# Patient Record
Sex: FEMALE | Race: WHITE | NOT HISPANIC OR LATINO | Employment: FULL TIME | URBAN - METROPOLITAN AREA
[De-identification: names, ages, dates, MRNs, and addresses within clinical notes are randomized per-mention and may not be internally consistent; named-entity substitution may affect disease eponyms.]

---

## 2023-03-20 PROBLEM — I10 BENIGN HYPERTENSION: Status: ACTIVE | Noted: 2023-03-20

## 2023-03-20 PROBLEM — D25.9 UTERINE LEIOMYOMA: Status: ACTIVE | Noted: 2023-03-20

## 2023-03-20 PROBLEM — B97.7 HUMAN PAPILLOMA VIRUS (HPV) INFECTION: Status: ACTIVE | Noted: 2023-03-20

## 2023-03-20 PROBLEM — N93.9 ABNORMAL UTERINE BLEEDING: Status: ACTIVE | Noted: 2023-03-20

## 2023-03-20 PROBLEM — E66.01 MORBID OBESITY WITH BMI OF 45.0-49.9, ADULT (MULTI): Status: ACTIVE | Noted: 2023-03-20

## 2023-03-20 PROBLEM — E78.5 BORDERLINE HYPERLIPIDEMIA: Status: ACTIVE | Noted: 2023-03-20

## 2023-03-20 PROBLEM — R92.8 ABNORMAL MAMMOGRAM OF LEFT BREAST: Status: ACTIVE | Noted: 2023-03-20

## 2023-03-20 PROBLEM — R93.89 THICKENED ENDOMETRIUM: Status: ACTIVE | Noted: 2023-03-20

## 2023-03-20 PROBLEM — N93.8 DUB (DYSFUNCTIONAL UTERINE BLEEDING): Status: ACTIVE | Noted: 2023-03-20

## 2023-03-20 PROBLEM — G47.19 EXCESSIVE DAYTIME SLEEPINESS: Status: ACTIVE | Noted: 2023-03-20

## 2023-03-20 PROBLEM — N85.2 ENLARGED UTERUS: Status: ACTIVE | Noted: 2023-03-20

## 2023-03-20 PROBLEM — N92.1 MENORRHAGIA WITH IRREGULAR CYCLE: Status: ACTIVE | Noted: 2023-03-20

## 2023-03-20 PROBLEM — Z98.84 BARIATRIC SURGERY STATUS: Status: ACTIVE | Noted: 2023-03-20

## 2023-03-20 PROBLEM — F17.200 TOBACCO USE DISORDER: Status: ACTIVE | Noted: 2023-03-20

## 2023-03-20 PROBLEM — E66.9 OBESITY (BMI 30-39.9): Status: ACTIVE | Noted: 2023-03-20

## 2023-03-20 PROBLEM — N84.0 ENDOMETRIAL POLYP: Status: ACTIVE | Noted: 2023-03-20

## 2023-03-20 PROBLEM — R87.810 CERVICAL HIGH RISK HPV (HUMAN PAPILLOMAVIRUS) TEST POSITIVE: Status: ACTIVE | Noted: 2023-03-20

## 2023-03-20 PROBLEM — E55.9 VITAMIN D DEFICIENCY: Status: ACTIVE | Noted: 2023-03-20

## 2023-03-20 PROBLEM — F54 PSYCHOLOGICAL FACTOR AFFECTING PHYSICAL CONDITION: Status: ACTIVE | Noted: 2023-03-20

## 2023-03-20 PROBLEM — O36.80X0 ENCOUNTER TO DETERMINE FETAL VIABILITY OF PREGNANCY (HHS-HCC): Status: ACTIVE | Noted: 2023-03-20

## 2023-03-20 RX ORDER — LABETALOL 200 MG/1
4 TABLET, FILM COATED ORAL 3 TIMES DAILY
COMMUNITY
Start: 2022-01-20 | End: 2023-03-28 | Stop reason: SDUPTHER

## 2023-03-20 RX ORDER — VALSARTAN 80 MG/1
80 TABLET ORAL DAILY
COMMUNITY
Start: 2020-02-06 | End: 2023-03-28 | Stop reason: SDUPTHER

## 2023-03-28 ENCOUNTER — OFFICE VISIT (OUTPATIENT)
Dept: PRIMARY CARE | Facility: CLINIC | Age: 43
End: 2023-03-28
Payer: COMMERCIAL

## 2023-03-28 VITALS
BODY MASS INDEX: 47.2 KG/M2 | HEART RATE: 79 BPM | DIASTOLIC BLOOD PRESSURE: 90 MMHG | SYSTOLIC BLOOD PRESSURE: 146 MMHG | WEIGHT: 275 LBS | TEMPERATURE: 97.3 F | OXYGEN SATURATION: 98 %

## 2023-03-28 DIAGNOSIS — Z00.00 HEALTHCARE MAINTENANCE: ICD-10-CM

## 2023-03-28 DIAGNOSIS — E66.01 MORBID OBESITY WITH BMI OF 45.0-49.9, ADULT (MULTI): ICD-10-CM

## 2023-03-28 DIAGNOSIS — E88.819 INSULIN RESISTANCE: ICD-10-CM

## 2023-03-28 DIAGNOSIS — I10 BENIGN HYPERTENSION: Primary | ICD-10-CM

## 2023-03-28 DIAGNOSIS — I10 ESSENTIAL HYPERTENSION WITH GOAL BLOOD PRESSURE LESS THAN 140/90: ICD-10-CM

## 2023-03-28 DIAGNOSIS — E55.9 VITAMIN D DEFICIENCY: ICD-10-CM

## 2023-03-28 PROCEDURE — 3080F DIAST BP >= 90 MM HG: CPT | Performed by: FAMILY MEDICINE

## 2023-03-28 PROCEDURE — 99214 OFFICE O/P EST MOD 30 MIN: CPT | Performed by: FAMILY MEDICINE

## 2023-03-28 PROCEDURE — 3077F SYST BP >= 140 MM HG: CPT | Performed by: FAMILY MEDICINE

## 2023-03-28 PROCEDURE — 3008F BODY MASS INDEX DOCD: CPT | Performed by: FAMILY MEDICINE

## 2023-03-28 RX ORDER — VALSARTAN 80 MG/1
80 TABLET ORAL DAILY
Qty: 90 TABLET | Refills: 1 | Status: SHIPPED | OUTPATIENT
Start: 2023-03-28 | End: 2023-06-06 | Stop reason: DRUGHIGH

## 2023-03-28 RX ORDER — LISINOPRIL AND HYDROCHLOROTHIAZIDE 10; 12.5 MG/1; MG/1
1 TABLET ORAL
COMMUNITY
Start: 2016-07-07 | End: 2023-03-28 | Stop reason: DRUGHIGH

## 2023-03-28 RX ORDER — LABETALOL 200 MG/1
3 TABLET, FILM COATED ORAL 3 TIMES DAILY
Qty: 270 TABLET | Refills: 2 | Status: SHIPPED | OUTPATIENT
Start: 2023-03-28 | End: 2023-05-08 | Stop reason: DRUGHIGH

## 2023-03-28 NOTE — PROGRESS NOTES
Subjective   Patient ID: Brittanie Maxwell is a 42 y.o. female who presents for Hypertension.  HPI    HTN: BP elevated today- she had run out of her Valsartan and restarted but has not been on consistently   She is taking her Labetalol regularly, but was hoping to wean off this robyn make     OBESITY: struggling with weight  Trying to adjust diet to healthier choices  She had been approved for bariatric surgery but then became pregnant with her son.  She is approximately 8 months postpartum.    Review of Systems  Review of Systems negative except as noted in HPI and Chief complaint.     Objective               Physical Exam  Constitutional:       General: She is not in acute distress.     Appearance: Normal appearance. She is not ill-appearing.   HENT:      Head: Normocephalic and atraumatic.   Neck:      Vascular: No carotid bruit.   Cardiovascular:      Rate and Rhythm: Normal rate and regular rhythm.      Pulses: Normal pulses.      Heart sounds: Normal heart sounds. No murmur heard.     No gallop.   Pulmonary:      Effort: Pulmonary effort is normal.      Breath sounds: Normal breath sounds. No wheezing, rhonchi or rales.   Musculoskeletal:      Cervical back: Normal range of motion and neck supple. No rigidity or tenderness.   Lymphadenopathy:      Cervical: No cervical adenopathy.   Skin:     General: Skin is warm and dry.   Neurological:      Mental Status: She is alert.   Psychiatric:         Mood and Affect: Mood normal.         Behavior: Behavior normal.       /90 (BP Location: Left arm, Patient Position: Sitting, BP Cuff Size: Adult)   Pulse 79   Temp 36.3 °C (97.3 °F)   Wt 125 kg (275 lb)   SpO2 98%   BMI 47.20 kg/m²      Assessment/Plan   Problem List Items Addressed This Visit          Circulatory    Benign hypertension - Primary    Relevant Orders    CBC    Comprehensive Metabolic Panel    Essential hypertension with goal blood pressure less than 140/90    Relevant Medications     valsartan (Diovan) 80 mg tablet    labetalol (Normodyne) 200 mg tablet       Endocrine/Metabolic    Vitamin D deficiency    Relevant Orders    Vitamin D, Total    Morbid obesity with BMI of 45.0-49.9, adult (CMS/[facility])     Other Visit Diagnoses       Healthcare maintenance        Relevant Orders    CBC    Comprehensive Metabolic Panel    Lipid Panel    TSH with reflex to Free T4 if abnormal    Vitamin D, Total    Insulin resistance        Relevant Orders    Comprehensive Metabolic Panel    Hemoglobin A1C          Follow-up for CPE and lab review at your convenience.  Follow BP at home as we titrate medications.

## 2023-03-28 NOTE — PATIENT INSTRUCTIONS
WEANING OFF LABETALOL:  Week 1: 2 pills three time daily + Valsartan 80 mg once daily  Week 2: 1 pill three times daily + Valsartan 80 mg (may increase to 2 tablets daily if needed)  Week 3: Stop Labetalol and take only Valsartan at  mg daily    Weight Loss agent: Injectables: Ozempic, Wegovy, Saxenda, Moujaro  Oral agents: Contrave, Qsymia    Follow up 1 month for CPE and medication adjustments.

## 2023-03-29 PROBLEM — E88.819 INSULIN RESISTANCE: Status: ACTIVE | Noted: 2023-03-29

## 2023-03-31 ENCOUNTER — PATIENT OUTREACH (OUTPATIENT)
Dept: CARE COORDINATION | Facility: CLINIC | Age: 43
End: 2023-03-31
Payer: COMMERCIAL

## 2023-03-31 NOTE — PROGRESS NOTES
New ref from Dr. Gregorio for need for wt loss. Attempted to call pt without success. Message left on VM with this Rds name and info. Enc pt to return call. Will attempt to reach pt again at later date.

## 2023-04-04 ENCOUNTER — PATIENT OUTREACH (OUTPATIENT)
Dept: CARE COORDINATION | Facility: CLINIC | Age: 43
End: 2023-04-04
Payer: COMMERCIAL

## 2023-04-04 NOTE — PROGRESS NOTES
Last attempt to reach pt without success. Message left on VM with this RDs info. Enc pt to return call if interested in working with an RD

## 2023-04-25 ENCOUNTER — APPOINTMENT (OUTPATIENT)
Dept: PRIMARY CARE | Facility: CLINIC | Age: 43
End: 2023-04-25
Payer: COMMERCIAL

## 2023-04-28 ENCOUNTER — LAB (OUTPATIENT)
Dept: LAB | Facility: LAB | Age: 43
End: 2023-04-28
Payer: COMMERCIAL

## 2023-04-28 DIAGNOSIS — Z00.00 HEALTHCARE MAINTENANCE: ICD-10-CM

## 2023-04-28 DIAGNOSIS — E88.819 INSULIN RESISTANCE: ICD-10-CM

## 2023-04-28 DIAGNOSIS — E55.9 VITAMIN D DEFICIENCY: ICD-10-CM

## 2023-04-28 DIAGNOSIS — I10 BENIGN HYPERTENSION: ICD-10-CM

## 2023-04-28 LAB
ALANINE AMINOTRANSFERASE (SGPT) (U/L) IN SER/PLAS: 14 U/L (ref 7–45)
ALBUMIN (G/DL) IN SER/PLAS: 4.4 G/DL (ref 3.4–5)
ALKALINE PHOSPHATASE (U/L) IN SER/PLAS: 64 U/L (ref 33–110)
ANION GAP IN SER/PLAS: 11 MMOL/L (ref 10–20)
ASPARTATE AMINOTRANSFERASE (SGOT) (U/L) IN SER/PLAS: 12 U/L (ref 9–39)
BILIRUBIN TOTAL (MG/DL) IN SER/PLAS: 0.4 MG/DL (ref 0–1.2)
CALCIDIOL (25 OH VITAMIN D3) (NG/ML) IN SER/PLAS: 32 NG/ML
CALCIUM (MG/DL) IN SER/PLAS: 10 MG/DL (ref 8.6–10.3)
CARBON DIOXIDE, TOTAL (MMOL/L) IN SER/PLAS: 30 MMOL/L (ref 21–32)
CHLORIDE (MMOL/L) IN SER/PLAS: 102 MMOL/L (ref 98–107)
CHOLESTEROL (MG/DL) IN SER/PLAS: 179 MG/DL (ref 0–199)
CHOLESTEROL IN HDL (MG/DL) IN SER/PLAS: 53.7 MG/DL
CHOLESTEROL/HDL RATIO: 3.3
CREATININE (MG/DL) IN SER/PLAS: 0.74 MG/DL (ref 0.5–1.05)
ERYTHROCYTE DISTRIBUTION WIDTH (RATIO) BY AUTOMATED COUNT: 14.5 % (ref 11.5–14.5)
ERYTHROCYTE MEAN CORPUSCULAR HEMOGLOBIN CONCENTRATION (G/DL) BY AUTOMATED: 31 G/DL (ref 32–36)
ERYTHROCYTE MEAN CORPUSCULAR VOLUME (FL) BY AUTOMATED COUNT: 87 FL (ref 80–100)
ERYTHROCYTES (10*6/UL) IN BLOOD BY AUTOMATED COUNT: 4.91 X10E12/L (ref 4–5.2)
ESTIMATED AVERAGE GLUCOSE FOR HBA1C: 117 MG/DL
GFR FEMALE: >90 ML/MIN/1.73M2
GLUCOSE (MG/DL) IN SER/PLAS: 85 MG/DL (ref 74–99)
HEMATOCRIT (%) IN BLOOD BY AUTOMATED COUNT: 42.6 % (ref 36–46)
HEMOGLOBIN (G/DL) IN BLOOD: 13.2 G/DL (ref 12–16)
HEMOGLOBIN A1C/HEMOGLOBIN TOTAL IN BLOOD: 5.7 %
LDL: 108 MG/DL (ref 0–99)
LEUKOCYTES (10*3/UL) IN BLOOD BY AUTOMATED COUNT: 7.9 X10E9/L (ref 4.4–11.3)
PLATELETS (10*3/UL) IN BLOOD AUTOMATED COUNT: 313 X10E9/L (ref 150–450)
POTASSIUM (MMOL/L) IN SER/PLAS: 4.5 MMOL/L (ref 3.5–5.3)
PROTEIN TOTAL: 6.4 G/DL (ref 6.4–8.2)
SODIUM (MMOL/L) IN SER/PLAS: 138 MMOL/L (ref 136–145)
THYROTROPIN (MIU/L) IN SER/PLAS BY DETECTION LIMIT <= 0.05 MIU/L: 1.38 MIU/L (ref 0.44–3.98)
TRIGLYCERIDE (MG/DL) IN SER/PLAS: 86 MG/DL (ref 0–149)
UREA NITROGEN (MG/DL) IN SER/PLAS: 11 MG/DL (ref 6–23)
VLDL: 17 MG/DL (ref 0–40)

## 2023-04-28 PROCEDURE — 80053 COMPREHEN METABOLIC PANEL: CPT

## 2023-04-28 PROCEDURE — 36415 COLL VENOUS BLD VENIPUNCTURE: CPT

## 2023-04-28 PROCEDURE — 80061 LIPID PANEL: CPT

## 2023-04-28 PROCEDURE — 84443 ASSAY THYROID STIM HORMONE: CPT

## 2023-04-28 PROCEDURE — 83036 HEMOGLOBIN GLYCOSYLATED A1C: CPT

## 2023-04-28 PROCEDURE — 82306 VITAMIN D 25 HYDROXY: CPT

## 2023-04-28 PROCEDURE — 85027 COMPLETE CBC AUTOMATED: CPT

## 2023-05-05 RX ORDER — HYDROXYZINE PAMOATE 25 MG/1
25 CAPSULE ORAL EVERY 8 HOURS PRN
COMMUNITY
Start: 2015-11-17 | End: 2024-05-07 | Stop reason: ALTCHOICE

## 2023-05-08 ENCOUNTER — OFFICE VISIT (OUTPATIENT)
Dept: PRIMARY CARE | Facility: CLINIC | Age: 43
End: 2023-05-08
Payer: COMMERCIAL

## 2023-05-08 VITALS
WEIGHT: 276.4 LBS | DIASTOLIC BLOOD PRESSURE: 100 MMHG | HEART RATE: 88 BPM | BODY MASS INDEX: 47.44 KG/M2 | OXYGEN SATURATION: 98 % | SYSTOLIC BLOOD PRESSURE: 142 MMHG

## 2023-05-08 DIAGNOSIS — Z00.00 HEALTHCARE MAINTENANCE: Primary | ICD-10-CM

## 2023-05-08 DIAGNOSIS — R73.03 PRE-DIABETES: ICD-10-CM

## 2023-05-08 DIAGNOSIS — I10 BENIGN HYPERTENSION: ICD-10-CM

## 2023-05-08 DIAGNOSIS — E66.01 MORBID OBESITY WITH BMI OF 45.0-49.9, ADULT (MULTI): ICD-10-CM

## 2023-05-08 PROCEDURE — 99396 PREV VISIT EST AGE 40-64: CPT | Performed by: FAMILY MEDICINE

## 2023-05-08 PROCEDURE — 3080F DIAST BP >= 90 MM HG: CPT | Performed by: FAMILY MEDICINE

## 2023-05-08 PROCEDURE — 3077F SYST BP >= 140 MM HG: CPT | Performed by: FAMILY MEDICINE

## 2023-05-08 RX ORDER — VALSARTAN AND HYDROCHLOROTHIAZIDE 160; 25 MG/1; MG/1
1 TABLET ORAL DAILY
Qty: 30 TABLET | Refills: 11 | Status: SHIPPED | OUTPATIENT
Start: 2023-05-08 | End: 2023-06-06 | Stop reason: DRUGHIGH

## 2023-05-08 NOTE — PROGRESS NOTES
Subjective   Patient ID: Brittanie Maxwell is a 43 y.o. female who presents for Annual Exam (BP concerns still being high.).  HPI    SOC Hx:     Tobacco Use: Low Risk  (3/28/2023)    Patient History     Smoking Tobacco Use: Never     Smokeless Tobacco Use: Never     Passive Exposure: Not on file     Alcohol Use: Not on file       DIET: general    EXERCISE: The patient does not participate in regular exercise at present.    ELIMINATION: no constipation or diarrhea    No urinary issuesnone    SLEEP: no issues minimally disturbed    MOODS:   PHQ-2: 0    OB/GYN: LMP: No LMP recorded.  Followed by Ob/gyn  Menstrual cycles - irregular         Review of Systems    Review of Systems negative except as noted in HPI and Chief complaint.     Objective               Physical Exam  Constitutional:       General: She is not in acute distress.     Appearance: Normal appearance.   HENT:      Head: Normocephalic and atraumatic.      Right Ear: Tympanic membrane, ear canal and external ear normal.      Left Ear: Tympanic membrane, ear canal and external ear normal.      Nose: Nose normal.      Mouth/Throat:      Mouth: Mucous membranes are moist.      Pharynx: Oropharynx is clear.   Eyes:      Extraocular Movements: Extraocular movements intact.      Conjunctiva/sclera: Conjunctivae normal.      Pupils: Pupils are equal, round, and reactive to light.   Neck:      Vascular: No carotid bruit.   Cardiovascular:      Rate and Rhythm: Normal rate and regular rhythm.      Pulses: Normal pulses.      Heart sounds: Normal heart sounds. No murmur heard.  Pulmonary:      Effort: Pulmonary effort is normal.      Breath sounds: Normal breath sounds. No wheezing, rhonchi or rales.   Abdominal:      General: Abdomen is flat. Bowel sounds are normal. There is no distension.      Palpations: Abdomen is soft.      Tenderness: There is no abdominal tenderness. There is no guarding or rebound.   Musculoskeletal:         General: Normal range of  motion.      Cervical back: No tenderness.   Lymphadenopathy:      Cervical: No cervical adenopathy.   Skin:     General: Skin is warm and dry.      Findings: No rash.   Neurological:      General: No focal deficit present.      Mental Status: She is alert and oriented to person, place, and time.      Cranial Nerves: No cranial nerve deficit.      Coordination: Coordination normal.      Gait: Gait normal.   Psychiatric:         Mood and Affect: Mood normal.         Behavior: Behavior normal.       BP (!) 142/100 (BP Location: Left arm, Patient Position: Sitting, BP Cuff Size: Adult)   Pulse 88   Wt 125 kg (276 lb 6.4 oz)   SpO2 98%   BMI 47.44 kg/m²      Assessment/Plan   Problem List Items Addressed This Visit          Circulatory    Benign hypertension    Relevant Medications    valsartan-hydrochlorothiazide (Diovan-HCT) 160-25 mg tablet       Endocrine/Metabolic    Morbid obesity with BMI of 45.0-49.9, adult (CMS/[facility])    Pre-diabetes       Other    Healthcare maintenance - Primary

## 2023-05-08 NOTE — PATIENT INSTRUCTIONS
Ozempic 0.25 mg weekly x 2-4 weeks, then we will titrate to 0.5 mg weekly  Eventually maintenance may be 1-2 mg weekly.    Valsartan 160/25 daily    Follow-up  1 month for BP and weight check

## 2023-05-18 ENCOUNTER — TELEPHONE (OUTPATIENT)
Dept: PRIMARY CARE | Facility: CLINIC | Age: 43
End: 2023-05-18
Payer: COMMERCIAL

## 2023-05-18 DIAGNOSIS — E66.01 MORBID OBESITY WITH BMI OF 45.0-49.9, ADULT (MULTI): ICD-10-CM

## 2023-05-18 DIAGNOSIS — E88.819 INSULIN RESISTANCE: Primary | ICD-10-CM

## 2023-05-18 DIAGNOSIS — R73.03 PRE-DIABETES: ICD-10-CM

## 2023-05-18 NOTE — TELEPHONE ENCOUNTER
Patient called in stating that her insurance will not cover the ozempic, it is way too much money out of pocket, she is asking if she could try Victoza?

## 2023-05-21 PROBLEM — Z00.00 HEALTHCARE MAINTENANCE: Status: ACTIVE | Noted: 2023-05-21

## 2023-05-21 ASSESSMENT — PATIENT HEALTH QUESTIONNAIRE - PHQ9
1. LITTLE INTEREST OR PLEASURE IN DOING THINGS: NOT AT ALL
SUM OF ALL RESPONSES TO PHQ9 QUESTIONS 1 AND 2: 0
2. FEELING DOWN, DEPRESSED OR HOPELESS: NOT AT ALL

## 2023-06-06 ENCOUNTER — OFFICE VISIT (OUTPATIENT)
Dept: PRIMARY CARE | Facility: CLINIC | Age: 43
End: 2023-06-06
Payer: COMMERCIAL

## 2023-06-06 DIAGNOSIS — I10 BENIGN HYPERTENSION: ICD-10-CM

## 2023-06-06 DIAGNOSIS — E66.01 CLASS 3 SEVERE OBESITY DUE TO EXCESS CALORIES WITH SERIOUS COMORBIDITY AND BODY MASS INDEX (BMI) OF 40.0 TO 44.9 IN ADULT (MULTI): Primary | ICD-10-CM

## 2023-06-06 DIAGNOSIS — E88.819 INSULIN RESISTANCE: ICD-10-CM

## 2023-06-06 PROCEDURE — 1036F TOBACCO NON-USER: CPT | Performed by: FAMILY MEDICINE

## 2023-06-06 PROCEDURE — 3008F BODY MASS INDEX DOCD: CPT | Performed by: FAMILY MEDICINE

## 2023-06-06 PROCEDURE — 3074F SYST BP LT 130 MM HG: CPT | Performed by: FAMILY MEDICINE

## 2023-06-06 PROCEDURE — 99213 OFFICE O/P EST LOW 20 MIN: CPT | Performed by: FAMILY MEDICINE

## 2023-06-06 PROCEDURE — 3079F DIAST BP 80-89 MM HG: CPT | Performed by: FAMILY MEDICINE

## 2023-06-06 RX ORDER — VALSARTAN AND HYDROCHLOROTHIAZIDE 320; 25 MG/1; MG/1
1 TABLET, FILM COATED ORAL DAILY
Qty: 90 TABLET | Refills: 1 | Status: SHIPPED | OUTPATIENT
Start: 2023-06-06 | End: 2023-09-12 | Stop reason: SDUPTHER

## 2023-06-06 NOTE — PROGRESS NOTES
"Subjective   Patient ID: Brittanie Maxwell is a 43 y.o. female who presents for Follow-up.  HPI    HTN: BP conrolled today  Denies CP, SOB, Edema, palpitations or headache.     ELEVATED BMI: Victoza rx last visit  Watching diet and making better choices  Trying to walk regularly  Down 3 pounds from last visit    Review of last labs with prediabetic Hba1c.      Review of Systems    Review of Systems negative except as noted in HPI and Chief complaint.     Objective               Physical Exam  Constitutional:       General: She is not in acute distress.     Appearance: Normal appearance. She is not ill-appearing.   HENT:      Head: Normocephalic and atraumatic.   Neck:      Vascular: No carotid bruit.   Cardiovascular:      Rate and Rhythm: Normal rate and regular rhythm.      Pulses: Normal pulses.      Heart sounds: Normal heart sounds. No murmur heard.     No gallop.   Pulmonary:      Effort: Pulmonary effort is normal.      Breath sounds: Normal breath sounds. No wheezing, rhonchi or rales.   Musculoskeletal:      Cervical back: Normal range of motion and neck supple. No rigidity or tenderness.   Lymphadenopathy:      Cervical: No cervical adenopathy.   Skin:     General: Skin is warm and dry.   Neurological:      Mental Status: She is alert.   Psychiatric:         Mood and Affect: Mood normal.         Behavior: Behavior normal.       /80 (BP Location: Left arm, Patient Position: Sitting, BP Cuff Size: Adult)   Pulse 82   Ht 1.626 m (5' 4\")   Wt 124 kg (273 lb)   SpO2 98%   BMI 46.86 kg/m²      Assessment/Plan   Problem List Items Addressed This Visit       Benign hypertension    Relevant Medications    valsartan-hydrochlorothiazide (Diovan-HCT) 320-25 mg tablet    Other Relevant Orders    Basic metabolic panel    Class 3 severe obesity due to excess calories with serious comorbidity and body mass index (BMI) of 40.0 to 44.9 in adult (CMS/[facility]) - Primary    Insulin resistance     Follow up 1 month " - after starting Victoza.

## 2023-06-22 VITALS
DIASTOLIC BLOOD PRESSURE: 80 MMHG | BODY MASS INDEX: 46.61 KG/M2 | OXYGEN SATURATION: 98 % | HEIGHT: 64 IN | SYSTOLIC BLOOD PRESSURE: 128 MMHG | WEIGHT: 273 LBS | HEART RATE: 82 BPM

## 2023-06-22 PROBLEM — E66.813 CLASS 3 SEVERE OBESITY DUE TO EXCESS CALORIES WITH SERIOUS COMORBIDITY AND BODY MASS INDEX (BMI) OF 40.0 TO 44.9 IN ADULT: Status: ACTIVE | Noted: 2023-03-20

## 2023-08-04 DIAGNOSIS — E88.819 INSULIN RESISTANCE: ICD-10-CM

## 2023-08-04 DIAGNOSIS — R73.03 PRE-DIABETES: ICD-10-CM

## 2023-08-04 RX ORDER — PEN NEEDLE, DIABETIC 30 GX3/16"
1 NEEDLE, DISPOSABLE MISCELLANEOUS DAILY
Qty: 100 EACH | Refills: 3 | Status: SHIPPED | OUTPATIENT
Start: 2023-08-04 | End: 2024-05-07 | Stop reason: DRUGHIGH

## 2023-08-04 RX ORDER — PEN NEEDLE, DIABETIC 30 GX3/16"
1 NEEDLE, DISPOSABLE MISCELLANEOUS DAILY
COMMUNITY
End: 2023-08-04 | Stop reason: SDUPTHER

## 2023-08-04 NOTE — TELEPHONE ENCOUNTER
Patient only got 3 pen needles for her victoza, pharmacy told patient to contact our office for more, pended new RX for pen needles.

## 2023-09-12 DIAGNOSIS — I10 BENIGN HYPERTENSION: ICD-10-CM

## 2023-09-12 RX ORDER — VALSARTAN AND HYDROCHLOROTHIAZIDE 320; 25 MG/1; MG/1
1 TABLET, FILM COATED ORAL DAILY
Qty: 90 TABLET | Refills: 0 | Status: SHIPPED | OUTPATIENT
Start: 2023-09-12 | End: 2023-09-15 | Stop reason: SDUPTHER

## 2023-09-15 DIAGNOSIS — I10 BENIGN HYPERTENSION: ICD-10-CM

## 2023-09-15 RX ORDER — VALSARTAN AND HYDROCHLOROTHIAZIDE 320; 25 MG/1; MG/1
1 TABLET, FILM COATED ORAL DAILY
Qty: 90 TABLET | Refills: 0 | Status: SHIPPED | OUTPATIENT
Start: 2023-09-15 | End: 2023-09-29 | Stop reason: SDUPTHER

## 2023-09-22 ENCOUNTER — APPOINTMENT (OUTPATIENT)
Dept: PRIMARY CARE | Facility: CLINIC | Age: 43
End: 2023-09-22
Payer: COMMERCIAL

## 2023-09-29 ENCOUNTER — LAB (OUTPATIENT)
Dept: LAB | Facility: LAB | Age: 43
End: 2023-09-29
Payer: COMMERCIAL

## 2023-09-29 ENCOUNTER — OFFICE VISIT (OUTPATIENT)
Dept: PRIMARY CARE | Facility: CLINIC | Age: 43
End: 2023-09-29
Payer: COMMERCIAL

## 2023-09-29 VITALS
WEIGHT: 278.2 LBS | HEART RATE: 104 BPM | SYSTOLIC BLOOD PRESSURE: 116 MMHG | RESPIRATION RATE: 18 BRPM | BODY MASS INDEX: 47.5 KG/M2 | HEIGHT: 64 IN | DIASTOLIC BLOOD PRESSURE: 78 MMHG | OXYGEN SATURATION: 96 %

## 2023-09-29 DIAGNOSIS — I10 BENIGN HYPERTENSION: ICD-10-CM

## 2023-09-29 LAB
ANION GAP IN SER/PLAS: 10 MMOL/L (ref 10–20)
CALCIUM (MG/DL) IN SER/PLAS: 9.8 MG/DL (ref 8.6–10.3)
CARBON DIOXIDE, TOTAL (MMOL/L) IN SER/PLAS: 31 MMOL/L (ref 21–32)
CHLORIDE (MMOL/L) IN SER/PLAS: 101 MMOL/L (ref 98–107)
CREATININE (MG/DL) IN SER/PLAS: 0.77 MG/DL (ref 0.5–1.05)
GFR FEMALE: >90 ML/MIN/1.73M2
GLUCOSE (MG/DL) IN SER/PLAS: 93 MG/DL (ref 74–99)
POTASSIUM (MMOL/L) IN SER/PLAS: 4 MMOL/L (ref 3.5–5.3)
SODIUM (MMOL/L) IN SER/PLAS: 138 MMOL/L (ref 136–145)
UREA NITROGEN (MG/DL) IN SER/PLAS: 13 MG/DL (ref 6–23)

## 2023-09-29 PROCEDURE — 3078F DIAST BP <80 MM HG: CPT | Performed by: FAMILY MEDICINE

## 2023-09-29 PROCEDURE — 3074F SYST BP LT 130 MM HG: CPT | Performed by: FAMILY MEDICINE

## 2023-09-29 PROCEDURE — 80048 BASIC METABOLIC PNL TOTAL CA: CPT

## 2023-09-29 PROCEDURE — 1036F TOBACCO NON-USER: CPT | Performed by: FAMILY MEDICINE

## 2023-09-29 PROCEDURE — 36415 COLL VENOUS BLD VENIPUNCTURE: CPT

## 2023-09-29 PROCEDURE — 99213 OFFICE O/P EST LOW 20 MIN: CPT | Performed by: FAMILY MEDICINE

## 2023-09-29 PROCEDURE — 3008F BODY MASS INDEX DOCD: CPT | Performed by: FAMILY MEDICINE

## 2023-09-29 RX ORDER — VALSARTAN AND HYDROCHLOROTHIAZIDE 320; 25 MG/1; MG/1
1 TABLET, FILM COATED ORAL DAILY
Qty: 90 TABLET | Refills: 0 | Status: SHIPPED | OUTPATIENT
Start: 2023-09-29 | End: 2024-01-03

## 2023-09-29 RX ORDER — AMLODIPINE BESYLATE 5 MG/1
5 TABLET ORAL DAILY
Qty: 30 TABLET | Refills: 2 | Status: SHIPPED | OUTPATIENT
Start: 2023-09-29 | End: 2023-12-29

## 2023-09-29 ASSESSMENT — ENCOUNTER SYMPTOMS
OCCASIONAL FEELINGS OF UNSTEADINESS: 0
DEPRESSION: 0
LOSS OF SENSATION IN FEET: 0

## 2023-09-29 ASSESSMENT — COLUMBIA-SUICIDE SEVERITY RATING SCALE - C-SSRS
2. HAVE YOU ACTUALLY HAD ANY THOUGHTS OF KILLING YOURSELF?: NO
6. HAVE YOU EVER DONE ANYTHING, STARTED TO DO ANYTHING, OR PREPARED TO DO ANYTHING TO END YOUR LIFE?: NO
1. IN THE PAST MONTH, HAVE YOU WISHED YOU WERE DEAD OR WISHED YOU COULD GO TO SLEEP AND NOT WAKE UP?: NO

## 2023-09-29 ASSESSMENT — PATIENT HEALTH QUESTIONNAIRE - PHQ9
SUM OF ALL RESPONSES TO PHQ9 QUESTIONS 1 AND 2: 0
1. LITTLE INTEREST OR PLEASURE IN DOING THINGS: NOT AT ALL
2. FEELING DOWN, DEPRESSED OR HOPELESS: NOT AT ALL

## 2023-09-29 NOTE — PROGRESS NOTES
"Subjective   Patient ID: Brittanie Maxwell is a 43 y.o. female who presents for Follow-up.  HPI    HTN: BP much improved today  Denies CP, SOB, Edema, palpitations or headache.     ELEVATED BMI: tolerating Victoza  No constipation  Diet modifications continue    Review of Systems    Review of Systems negative except as noted in HPI and Chief complaint.     Objective               Physical Exam  Constitutional:       General: She is not in acute distress.     Appearance: Normal appearance. She is not ill-appearing.   HENT:      Head: Normocephalic and atraumatic.   Neck:      Vascular: No carotid bruit.   Cardiovascular:      Rate and Rhythm: Normal rate and regular rhythm.      Pulses: Normal pulses.      Heart sounds: Normal heart sounds. No murmur heard.     No gallop.   Pulmonary:      Effort: Pulmonary effort is normal.      Breath sounds: Normal breath sounds. No wheezing, rhonchi or rales.   Musculoskeletal:      Cervical back: Normal range of motion and neck supple. No rigidity or tenderness.   Lymphadenopathy:      Cervical: No cervical adenopathy.   Skin:     General: Skin is warm and dry.   Neurological:      Mental Status: She is alert.   Psychiatric:         Mood and Affect: Mood normal.         Behavior: Behavior normal.       /78   Pulse 104   Resp 18   Ht 1.626 m (5' 4\")   Wt 126 kg (278 lb 3.2 oz)   SpO2 96%   BMI 47.75 kg/m²      Assessment/Plan   Problem List Items Addressed This Visit       Benign hypertension    Relevant Medications    valsartan-hydrochlorothiazide (Diovan-HCT) 320-25 mg tablet    amLODIPine (Norvasc) 5 mg tablet          "

## 2023-09-29 NOTE — PATIENT INSTRUCTIONS
There are many weight loss medication options as listed below:  Oral forms: Contrave - Marlin  Injectable forms: Saxenda, Wegovy, Mounjaro  Please check with your insurance company regarding coverage - please confirm what diagnosis these are covered under as some insurance companies will not cover these medications unless you have a diagnosis of Diabetes or Prediabetes.    Once you confirm coverage please call the office to arrange a virtual appointment to discuss your weight loss journey and which options we feel are most appropriative for you.

## 2023-12-29 DIAGNOSIS — I10 BENIGN HYPERTENSION: ICD-10-CM

## 2023-12-29 RX ORDER — AMLODIPINE BESYLATE 5 MG/1
5 TABLET ORAL DAILY
Qty: 90 TABLET | Refills: 0 | Status: SHIPPED | OUTPATIENT
Start: 2023-12-29 | End: 2024-04-18

## 2024-01-03 DIAGNOSIS — I10 BENIGN HYPERTENSION: ICD-10-CM

## 2024-01-03 RX ORDER — VALSARTAN AND HYDROCHLOROTHIAZIDE 320; 25 MG/1; MG/1
1 TABLET, FILM COATED ORAL DAILY
Qty: 90 TABLET | Refills: 0 | Status: SHIPPED | OUTPATIENT
Start: 2024-01-03 | End: 2024-05-02 | Stop reason: SDUPTHER

## 2024-03-20 ENCOUNTER — APPOINTMENT (OUTPATIENT)
Dept: OBSTETRICS AND GYNECOLOGY | Facility: CLINIC | Age: 44
End: 2024-03-20
Payer: COMMERCIAL

## 2024-04-09 ENCOUNTER — APPOINTMENT (OUTPATIENT)
Dept: OBSTETRICS AND GYNECOLOGY | Facility: CLINIC | Age: 44
End: 2024-04-09
Payer: COMMERCIAL

## 2024-04-23 ENCOUNTER — LAB (OUTPATIENT)
Dept: LAB | Facility: LAB | Age: 44
End: 2024-04-23
Payer: COMMERCIAL

## 2024-04-23 ENCOUNTER — OFFICE VISIT (OUTPATIENT)
Dept: OBSTETRICS AND GYNECOLOGY | Facility: CLINIC | Age: 44
End: 2024-04-23
Payer: COMMERCIAL

## 2024-04-23 VITALS
WEIGHT: 282 LBS | BODY MASS INDEX: 49.96 KG/M2 | DIASTOLIC BLOOD PRESSURE: 88 MMHG | SYSTOLIC BLOOD PRESSURE: 120 MMHG | HEIGHT: 63 IN

## 2024-04-23 DIAGNOSIS — N93.9 ABNORMAL UTERINE BLEEDING: ICD-10-CM

## 2024-04-23 DIAGNOSIS — N93.9 ABNORMAL UTERINE BLEEDING: Primary | ICD-10-CM

## 2024-04-23 DIAGNOSIS — N84.0 ENDOMETRIAL POLYP: ICD-10-CM

## 2024-04-23 DIAGNOSIS — Z13.1 DIABETES MELLITUS SCREENING: ICD-10-CM

## 2024-04-23 DIAGNOSIS — N76.0 BV (BACTERIAL VAGINOSIS): ICD-10-CM

## 2024-04-23 DIAGNOSIS — Z12.4 SCREENING FOR MALIGNANT NEOPLASM OF CERVIX: ICD-10-CM

## 2024-04-23 DIAGNOSIS — Z86.718 HISTORY OF DVT (DEEP VEIN THROMBOSIS): ICD-10-CM

## 2024-04-23 DIAGNOSIS — B96.89 BV (BACTERIAL VAGINOSIS): ICD-10-CM

## 2024-04-23 DIAGNOSIS — Z11.51 SCREENING FOR HUMAN PAPILLOMAVIRUS (HPV): ICD-10-CM

## 2024-04-23 DIAGNOSIS — Z01.419 WELL WOMAN EXAM: ICD-10-CM

## 2024-04-23 LAB — TSH SERPL-ACNC: 0.99 MIU/L (ref 0.44–3.98)

## 2024-04-23 PROCEDURE — 3008F BODY MASS INDEX DOCD: CPT | Performed by: STUDENT IN AN ORGANIZED HEALTH CARE EDUCATION/TRAINING PROGRAM

## 2024-04-23 PROCEDURE — 36415 COLL VENOUS BLD VENIPUNCTURE: CPT

## 2024-04-23 PROCEDURE — 84443 ASSAY THYROID STIM HORMONE: CPT

## 2024-04-23 PROCEDURE — 3074F SYST BP LT 130 MM HG: CPT | Performed by: STUDENT IN AN ORGANIZED HEALTH CARE EDUCATION/TRAINING PROGRAM

## 2024-04-23 PROCEDURE — 3079F DIAST BP 80-89 MM HG: CPT | Performed by: STUDENT IN AN ORGANIZED HEALTH CARE EDUCATION/TRAINING PROGRAM

## 2024-04-23 PROCEDURE — 85300 ANTITHROMBIN III ACTIVITY: CPT

## 2024-04-23 PROCEDURE — 85303 CLOT INHIBIT PROT C ACTIVITY: CPT

## 2024-04-23 PROCEDURE — 83001 ASSAY OF GONADOTROPIN (FSH): CPT

## 2024-04-23 PROCEDURE — 84146 ASSAY OF PROLACTIN: CPT

## 2024-04-23 PROCEDURE — 85306 CLOT INHIBIT PROT S FREE: CPT

## 2024-04-23 PROCEDURE — 82670 ASSAY OF TOTAL ESTRADIOL: CPT

## 2024-04-23 PROCEDURE — 81241 F5 GENE: CPT

## 2024-04-23 PROCEDURE — 88175 CYTOPATH C/V AUTO FLUID REDO: CPT

## 2024-04-23 PROCEDURE — 83036 HEMOGLOBIN GLYCOSYLATED A1C: CPT

## 2024-04-23 PROCEDURE — 99396 PREV VISIT EST AGE 40-64: CPT | Performed by: STUDENT IN AN ORGANIZED HEALTH CARE EDUCATION/TRAINING PROGRAM

## 2024-04-23 PROCEDURE — 87205 SMEAR GRAM STAIN: CPT

## 2024-04-23 PROCEDURE — 87624 HPV HI-RISK TYP POOLED RSLT: CPT

## 2024-04-23 NOTE — PROGRESS NOTES
Subjective   Patient ID: Brittanie Maxwell is a 44 y.o. year old female patient presenting for   Chief Complaint   Patient presents with    new patient well woman    Annual Exam     Heavy periods  Fibroids found years ago  Hx of ovarian cysts      Patient presents for well woman exam.    She has very heavy and painful periods-this is a chronic problem. Patient will bleed for 7 days. She goes 2-3 pads per hour at peak flow. It makes difficult for her to get through her daily life. Patient had plans for ablation, but found out she was pregnant.  Patient has pain with intercourse 25% of the time. She also has pain with tampon insertion.    Sexual History: Sexually active with her fiance. They have been together for about 14 years.  Current Contraception: Patient had a tubal.  Menstrual History: See above.  Pregnancy History: She has 2 kids. C/s x2.  Occupation: Patient is a  for [employer].    No hx of breast cancer, ovarian, cervical, colon, or pancreatic cancer.        Review of Systems   All other systems reviewed and are negative.        Past Medical History:   Diagnosis Date    Chronic hypertension     Polyp of corpus uteri 2019    Endometrial polyp       Past Surgical History:   Procedure Laterality Date    OTHER SURGICAL HISTORY  2019     section       Social History     Socioeconomic History    Marital status:      Spouse name: Not on file    Number of children: Not on file    Years of education: Not on file    Highest education level: Not on file   Occupational History    Not on file   Tobacco Use    Smoking status: Never    Smokeless tobacco: Never   Substance and Sexual Activity    Alcohol use: Not Currently    Drug use: Never    Sexual activity: Not on file   Other Topics Concern    Not on file   Social History Narrative    Not on file     Social Determinants of Health     Financial Resource Strain: Not on file   Food Insecurity: Not on file   Transportation Needs: Not on  file   Physical Activity: Not on file   Stress: Not on file   Social Connections: Not on file   Intimate Partner Violence: Not on file   Housing Stability: Not on file           Objective   Physical Exam  General: A&Ox3  Head: Normocephalic, atraumatic  Heart/Lungs: Even chest rise, no increased work of breathing.  Breast: Normal in appearance bilaterally. No skin changes. No rashes or bruises. No palpable masses.  Abdomen: Soft, nontender. BS+4. No bruising or masses.  Genitourinary: Labia and vagina normal in appearance. Uterus is small, mobile, anteverted. No adnexal masses palpated.  Lower Extremities: No lower extremity Edema no palpable cords.     Assessment/Plan   Problem List Items Addressed This Visit       Abnormal uterine bleeding - Primary    Overview     TVUS ordered.  FSH, Estradiol, prolactin ordered.  TSH ordered.    Follow with results.         Relevant Orders    US PELVIS TRANSABDOMINAL WITH TRANSVAGINAL (Completed)    TSH with reflex to Free T4 if abnormal (Completed)    Prolactin (Completed)    Follicle Stimulating Hormone (Completed)    Estradiol (Completed)    Vaginitis Gram Stain For Bacterial Vaginosis + Yeast (Completed)    Endometrial polyp    Well woman exam    Overview     Pap obtained  DVT work up obtained  TVUS ordered for AUB.  Mammogram ordered.          Other Visit Diagnoses       Diabetes mellitus screening        Relevant Orders    Hemoglobin A1C (Completed)    History of DVT (deep vein thrombosis)        Relevant Orders    Factor V Leiden (Completed)    Protein S Activity (Completed)    Protein C Activity (Completed)    Antithrombin (Completed)    Screening for malignant neoplasm of cervix        Relevant Orders    THINPREP PAP (Completed)    HPV DNA High Risk With Genotype (Completed)    Screening for human papillomavirus (HPV)        Relevant Orders    THINPREP PAP (Completed)    HPV DNA High Risk With Genotype (Completed)                   Anurag Licona MD 04/20/24 3:41 PM

## 2024-04-24 ENCOUNTER — PATIENT MESSAGE (OUTPATIENT)
Dept: PRIMARY CARE | Facility: CLINIC | Age: 44
End: 2024-04-24
Payer: COMMERCIAL

## 2024-04-24 DIAGNOSIS — I10 BENIGN HYPERTENSION: ICD-10-CM

## 2024-04-24 LAB
CLUE CELLS VAG LPF-#/AREA: PRESENT /[LPF]
EST. AVERAGE GLUCOSE BLD GHB EST-MCNC: 117 MG/DL
ESTRADIOL SERPL-MCNC: 52 PG/ML
FSH SERPL-ACNC: 6 IU/L
HBA1C MFR BLD: 5.7 %
NUGENT SCORE: 8
PROLACTIN SERPL-MCNC: 4.6 UG/L (ref 3–20)
YEAST VAG WET PREP-#/AREA: ABNORMAL

## 2024-04-25 ENCOUNTER — HOSPITAL ENCOUNTER (OUTPATIENT)
Dept: RADIOLOGY | Facility: HOSPITAL | Age: 44
Discharge: HOME | End: 2024-04-25
Payer: COMMERCIAL

## 2024-04-25 DIAGNOSIS — N76.0 BV (BACTERIAL VAGINOSIS): Primary | ICD-10-CM

## 2024-04-25 DIAGNOSIS — B96.89 BV (BACTERIAL VAGINOSIS): Primary | ICD-10-CM

## 2024-04-25 DIAGNOSIS — N93.9 ABNORMAL UTERINE BLEEDING: ICD-10-CM

## 2024-04-25 PROCEDURE — 76856 US EXAM PELVIC COMPLETE: CPT | Performed by: RADIOLOGY

## 2024-04-25 PROCEDURE — 76856 US EXAM PELVIC COMPLETE: CPT

## 2024-04-25 PROCEDURE — 76830 TRANSVAGINAL US NON-OB: CPT | Performed by: RADIOLOGY

## 2024-04-25 RX ORDER — METRONIDAZOLE 500 MG/1
500 TABLET ORAL 2 TIMES DAILY
Qty: 14 TABLET | Refills: 0 | Status: SHIPPED | OUTPATIENT
Start: 2024-04-25 | End: 2024-05-02

## 2024-04-26 ENCOUNTER — TELEPHONE (OUTPATIENT)
Dept: OBSTETRICS AND GYNECOLOGY | Facility: CLINIC | Age: 44
End: 2024-04-26
Payer: COMMERCIAL

## 2024-04-26 NOTE — TELEPHONE ENCOUNTER
----- Message from Anurag Licona MD sent at 4/25/2024 11:34 PM EDT -----  Flagyl sent for BV, please let her know, thanks!  ----- Message -----  From: Lab, Background User  Sent: 4/24/2024   7:54 PM EDT  To: Anurag Licona MD

## 2024-04-29 LAB
ELECTRONICALLY SIGNED BY: NORMAL
FACTOR V LEIDEN INTERPRETATION: NORMAL
FACTOR V LEIDEN RESULT: NORMAL

## 2024-04-30 LAB
AT III ACT/NOR PPP CHRO: 96 % ACTIVITY (ref 80–130)
PROT C ACT/NOR PPP CHRO: 127 % ACTIVITY (ref 70–150)
PROT S ACT/NOR PPP: 85 % ACTIVITY (ref 64–150)

## 2024-05-02 RX ORDER — VALSARTAN AND HYDROCHLOROTHIAZIDE 320; 25 MG/1; MG/1
1 TABLET, FILM COATED ORAL DAILY
Qty: 14 TABLET | Refills: 0 | Status: SHIPPED | OUTPATIENT
Start: 2024-05-02 | End: 2024-05-07 | Stop reason: SDUPTHER

## 2024-05-02 RX ORDER — AMLODIPINE BESYLATE 5 MG/1
5 TABLET ORAL DAILY
Qty: 14 TABLET | Refills: 0 | Status: SHIPPED | OUTPATIENT
Start: 2024-05-02 | End: 2024-05-07 | Stop reason: SDUPTHER

## 2024-05-06 ENCOUNTER — APPOINTMENT (OUTPATIENT)
Dept: OBSTETRICS AND GYNECOLOGY | Facility: CLINIC | Age: 44
End: 2024-05-06
Payer: COMMERCIAL

## 2024-05-07 ENCOUNTER — OFFICE VISIT (OUTPATIENT)
Dept: PRIMARY CARE | Facility: CLINIC | Age: 44
End: 2024-05-07
Payer: COMMERCIAL

## 2024-05-07 VITALS
OXYGEN SATURATION: 97 % | HEART RATE: 78 BPM | BODY MASS INDEX: 50.14 KG/M2 | HEIGHT: 63 IN | SYSTOLIC BLOOD PRESSURE: 126 MMHG | WEIGHT: 283 LBS | DIASTOLIC BLOOD PRESSURE: 88 MMHG

## 2024-05-07 DIAGNOSIS — E28.2 POLYCYSTIC DISEASE, OVARIES: ICD-10-CM

## 2024-05-07 DIAGNOSIS — R73.03 PRE-DIABETES: Primary | ICD-10-CM

## 2024-05-07 DIAGNOSIS — E88.819 INSULIN RESISTANCE: ICD-10-CM

## 2024-05-07 DIAGNOSIS — I10 BENIGN HYPERTENSION: ICD-10-CM

## 2024-05-07 PROBLEM — N85.2 ENLARGED UTERUS: Status: RESOLVED | Noted: 2023-03-20 | Resolved: 2024-05-07

## 2024-05-07 PROBLEM — N92.1 MENORRHAGIA WITH IRREGULAR CYCLE: Status: RESOLVED | Noted: 2023-03-20 | Resolved: 2024-05-07

## 2024-05-07 PROBLEM — R93.89 THICKENED ENDOMETRIUM: Status: RESOLVED | Noted: 2023-03-20 | Resolved: 2024-05-07

## 2024-05-07 PROCEDURE — 3079F DIAST BP 80-89 MM HG: CPT | Performed by: FAMILY MEDICINE

## 2024-05-07 PROCEDURE — 3074F SYST BP LT 130 MM HG: CPT | Performed by: FAMILY MEDICINE

## 2024-05-07 PROCEDURE — 3008F BODY MASS INDEX DOCD: CPT | Performed by: FAMILY MEDICINE

## 2024-05-07 PROCEDURE — 99214 OFFICE O/P EST MOD 30 MIN: CPT | Performed by: FAMILY MEDICINE

## 2024-05-07 RX ORDER — METFORMIN HYDROCHLORIDE 500 MG/1
500 TABLET ORAL
Qty: 180 TABLET | Refills: 1 | Status: SHIPPED | OUTPATIENT
Start: 2024-05-07

## 2024-05-07 RX ORDER — VALSARTAN AND HYDROCHLOROTHIAZIDE 320; 25 MG/1; MG/1
1 TABLET, FILM COATED ORAL DAILY
Qty: 90 TABLET | Refills: 1 | Status: SHIPPED | OUTPATIENT
Start: 2024-05-07

## 2024-05-07 RX ORDER — AMLODIPINE BESYLATE 5 MG/1
5 TABLET ORAL DAILY
Qty: 90 TABLET | Refills: 1 | Status: SHIPPED | OUTPATIENT
Start: 2024-05-07

## 2024-05-07 ASSESSMENT — LIFESTYLE VARIABLES
HOW OFTEN DO YOU HAVE SIX OR MORE DRINKS ON ONE OCCASION: NEVER
AUDIT-C TOTAL SCORE: 1
SKIP TO QUESTIONS 9-10: 1
HOW MANY STANDARD DRINKS CONTAINING ALCOHOL DO YOU HAVE ON A TYPICAL DAY: PATIENT DOES NOT DRINK
HOW OFTEN DO YOU HAVE A DRINK CONTAINING ALCOHOL: MONTHLY OR LESS

## 2024-05-07 NOTE — PROGRESS NOTES
"Subjective   Patient ID: Brittanie Maxwell is a 43 y.o. female who presents for Follow-up.    HPI    HTN:  BP stable today  Denies CP, SOB, Edema, palpitations or headache.     FIBROIDS AND DUB: planning partial hysterectomy likely this summer for fibroids and bleeding  NO date scheduled yet.    INSULIN RESISTANCE:   Review of labs with eelvated fasting glucose, Hba1c in prediabetic range - 5.7%  Struggling to lose weight and noted to have multiple cysts vs. Follicles on elvic Ultrasound      Review of Systems    Review of Systems negative except as noted in HPI and Chief complaint.     Objective             VITALS:  /88 (BP Location: Right arm, Patient Position: Sitting, BP Cuff Size: Thigh)   Pulse 78   Ht 1.594 m (5' 2.75\")   Wt 128 kg (283 lb)   LMP 04/16/2024 (Exact Date)   SpO2 97%   BMI 50.53 kg/m²      Physical Exam  Constitutional:       General: She is not in acute distress.     Appearance: Normal appearance. She is not ill-appearing.   HENT:      Head: Normocephalic and atraumatic.   Neck:      Vascular: No carotid bruit.   Cardiovascular:      Rate and Rhythm: Normal rate and regular rhythm.      Pulses: Normal pulses.      Heart sounds: Normal heart sounds. No murmur heard.     No gallop.   Pulmonary:      Effort: Pulmonary effort is normal.      Breath sounds: Normal breath sounds. No wheezing, rhonchi or rales.   Musculoskeletal:      Cervical back: Normal range of motion and neck supple. No rigidity or tenderness.   Lymphadenopathy:      Cervical: No cervical adenopathy.   Skin:     General: Skin is warm and dry.   Neurological:      Mental Status: She is alert.   Psychiatric:         Mood and Affect: Mood normal.         Behavior: Behavior normal.         Assessment/Plan   Problem List Items Addressed This Visit       Benign hypertension     BP controlled on Amlodipine and Valsartan/hydrochlorothiazide   Refilling at current dosages.  Follow up 3-6 months.         Relevant " Medications    amLODIPine (Norvasc) 5 mg tablet    valsartan-hydrochlorothiazide (Diovan-HCT) 320-25 mg tablet    Insulin resistance     Trial of Metformin and will try to have GLP-1 approved.  Suggest recheck on labs and possible referral to nutritionist.  Follow up 3-4 months.         Relevant Medications    metFORMIN (Glucophage) 500 mg tablet    Pre-diabetes - Primary     Reviewed recent Hba1c.  Trial of Metformin and possible GLP-1 if approved.  Follow up 3-4 months.         Relevant Medications    semaglutide 0.25 mg or 0.5 mg (2 mg/3 mL) pen injector (Start on 5/12/2024)    Polycystic disease, ovaries     Trial of Metformin.  Follow up with ob/gyn for surgical options.         Relevant Medications    metFORMIN (Glucophage) 500 mg tablet     Suggest follow up within 30 days of surgery for pre-operative labs.    FOLLOW UP 3 MONTHS, SOONER WITH ANY PROBLEMS OR CONCERNS.

## 2024-05-08 ASSESSMENT — ENCOUNTER SYMPTOMS
OCCASIONAL FEELINGS OF UNSTEADINESS: 0
LOSS OF SENSATION IN FEET: 0
DEPRESSION: 0

## 2024-05-09 NOTE — ASSESSMENT & PLAN NOTE
BP controlled on Amlodipine and Valsartan/hydrochlorothiazide   Refilling at current dosages.  Follow up 3-6 months.

## 2024-05-09 NOTE — ASSESSMENT & PLAN NOTE
Trial of Metformin and will try to have GLP-1 approved.  Suggest recheck on labs and possible referral to nutritionist.  Follow up 3-4 months.

## 2024-05-20 RX ORDER — METRONIDAZOLE 500 MG/1
500 TABLET ORAL 2 TIMES DAILY
Qty: 14 TABLET | Refills: 0 | Status: SHIPPED | OUTPATIENT
Start: 2024-05-20 | End: 2024-05-27

## 2024-05-21 ENCOUNTER — APPOINTMENT (OUTPATIENT)
Dept: OBSTETRICS AND GYNECOLOGY | Facility: CLINIC | Age: 44
End: 2024-05-21
Payer: COMMERCIAL

## 2024-06-17 ENCOUNTER — APPOINTMENT (OUTPATIENT)
Dept: OBSTETRICS AND GYNECOLOGY | Facility: CLINIC | Age: 44
End: 2024-06-17
Payer: COMMERCIAL

## 2024-06-17 VITALS — DIASTOLIC BLOOD PRESSURE: 86 MMHG | BODY MASS INDEX: 50.46 KG/M2 | SYSTOLIC BLOOD PRESSURE: 104 MMHG | WEIGHT: 282.6 LBS

## 2024-06-17 DIAGNOSIS — Z32.02 PREGNANCY TEST NEGATIVE: Primary | ICD-10-CM

## 2024-06-17 DIAGNOSIS — R10.2 PELVIC PAIN: ICD-10-CM

## 2024-06-17 DIAGNOSIS — N93.9 ABNORMAL UTERINE BLEEDING: ICD-10-CM

## 2024-06-17 LAB — PREGNANCY TEST URINE, POC: NEGATIVE

## 2024-06-17 PROCEDURE — 81025 URINE PREGNANCY TEST: CPT | Performed by: STUDENT IN AN ORGANIZED HEALTH CARE EDUCATION/TRAINING PROGRAM

## 2024-06-17 PROCEDURE — 88305 TISSUE EXAM BY PATHOLOGIST: CPT

## 2024-06-17 PROCEDURE — 99213 OFFICE O/P EST LOW 20 MIN: CPT | Performed by: STUDENT IN AN ORGANIZED HEALTH CARE EDUCATION/TRAINING PROGRAM

## 2024-06-17 RX ORDER — MEGESTROL ACETATE 20 MG/1
20 TABLET ORAL 2 TIMES DAILY
Qty: 56 TABLET | Refills: 1 | Status: SHIPPED | OUTPATIENT
Start: 2024-06-17 | End: 2024-08-12

## 2024-06-17 NOTE — PROGRESS NOTES
Subjective   Patient ID: Brittanie Maxwell is a 44 y.o. female who presents for Procedure (Endometrial biopsy /).  Patient follows up for EMB. Consents signed. Patient has no additional complaints at this time.        Review of Systems   All other systems reviewed and are negative.    Past Medical History:   Diagnosis Date    Chronic hypertension     Polyp of corpus uteri 2019    Endometrial polyp     Past Surgical History:   Procedure Laterality Date    OTHER SURGICAL HISTORY  2019     section     Social History     Socioeconomic History    Marital status:      Spouse name: Not on file    Number of children: Not on file    Years of education: Not on file    Highest education level: Not on file   Occupational History    Not on file   Tobacco Use    Smoking status: Never    Smokeless tobacco: Never   Substance and Sexual Activity    Alcohol use: Not Currently    Drug use: Never    Sexual activity: Not on file   Other Topics Concern    Not on file   Social History Narrative    Not on file     Social Determinants of Health     Financial Resource Strain: Not on file   Food Insecurity: Not on file   Transportation Needs: Not on file   Physical Activity: Not on file   Stress: Not on file   Social Connections: Not on file   Intimate Partner Violence: Not on file   Housing Stability: Not on file         Objective   Physical Exam  General: A&Ox3  Head: Normocephalic, atraumatic  Heart/Lungs: Even chest rise, no increased work of breathing.  Abdomen: Soft, nontender. BS+4. No bruising or masses.  Genitourinary: Labia and vagina normal in appearance. Uterus is small, mobile, anteverted. No adnexal masses palpated.  Lower Extremities: No lower extremity Edema no palpable cords.     Assessment/Plan   Problem List Items Addressed This Visit       Abnormal uterine bleeding    Overview     EMB performed. Megace started.  TVUS shows thickened endometrium with possible fibroid vs polyp.    Patient has  a strong desire for definitive management with hysterectomy. She is not a good candidate for OCPs. She does not want an IUD. She has significant dyspareunia at the time of bleeding and cramping. Given associated pain as well as BMI, patient is not a good candidate for ablation. Will schedule for TLH, BS, cystourethroscopy pending EMB results.             Relevant Medications    megestrol (Megace) 20 mg tablet    Other Relevant Orders    Surgical Pathology Exam     Other Visit Diagnoses       Pregnancy test negative    -  Primary    Relevant Orders    POCT pregnancy, urine manually resulted (Completed)                   Anurag Licona MD 06/23/24 2:47 PM

## 2024-06-23 PROBLEM — N93.8 DUB (DYSFUNCTIONAL UTERINE BLEEDING): Status: RESOLVED | Noted: 2023-03-20 | Resolved: 2024-06-23

## 2024-06-24 LAB
LABORATORY COMMENT REPORT: NORMAL
PATH REPORT.FINAL DX SPEC: NORMAL
PATH REPORT.GROSS SPEC: NORMAL
PATH REPORT.RELEVANT HX SPEC: NORMAL
PATH REPORT.TOTAL CANCER: NORMAL

## 2024-06-26 DIAGNOSIS — R10.2 PELVIC PAIN: ICD-10-CM

## 2024-06-26 DIAGNOSIS — N93.9 ABNORMAL UTERINE BLEEDING: Primary | ICD-10-CM

## 2024-06-28 ENCOUNTER — TELEPHONE (OUTPATIENT)
Dept: OBSTETRICS AND GYNECOLOGY | Facility: CLINIC | Age: 44
End: 2024-06-28
Payer: COMMERCIAL

## 2024-06-28 NOTE — TELEPHONE ENCOUNTER
----- Message from Katlin Lucas RN sent at 6/28/2024 10:19 AM EDT -----  Regarding: FW: results  LMTC  ----- Message -----  From: Anurag Licona MD  Sent: 6/26/2024   7:38 AM EDT  To: Aisha Flores RN  Subject: RE: results                                      Yep, everything looks good, I put in her case request to get scheduled. Thanks!    ----- Message -----  From: Aisha Flores RN  Sent: 6/26/2024   7:11 AM EDT  To: Anurag Licnoa MD  Subject: FW: results                                      Will you review pathology result and advise me what to tell the patient regarding those and/or surgery.  ----- Message -----  From: Bozena Potter  Sent: 6/25/2024   9:04 AM EDT  To: Aisha Flores RN  Subject: results                                          The results from her endo bx are back, does she know this and can I proceed with scheduling surgery yet?

## 2024-07-09 ENCOUNTER — HOSPITAL ENCOUNTER (OUTPATIENT)
Facility: HOSPITAL | Age: 44
Setting detail: OUTPATIENT SURGERY
End: 2024-07-09
Attending: STUDENT IN AN ORGANIZED HEALTH CARE EDUCATION/TRAINING PROGRAM | Admitting: STUDENT IN AN ORGANIZED HEALTH CARE EDUCATION/TRAINING PROGRAM
Payer: COMMERCIAL

## 2024-07-09 PROBLEM — R10.2 PELVIC PAIN: Status: ACTIVE | Noted: 2024-06-17

## 2024-09-09 ENCOUNTER — APPOINTMENT (OUTPATIENT)
Dept: OBSTETRICS AND GYNECOLOGY | Facility: CLINIC | Age: 44
End: 2024-09-09
Payer: COMMERCIAL

## 2024-10-29 ENCOUNTER — TELEPHONE (OUTPATIENT)
Dept: PRIMARY CARE | Facility: CLINIC | Age: 44
End: 2024-10-29
Payer: COMMERCIAL

## 2024-10-29 DIAGNOSIS — E88.819 INSULIN RESISTANCE: ICD-10-CM

## 2024-10-29 DIAGNOSIS — I10 BENIGN HYPERTENSION: ICD-10-CM

## 2024-10-29 DIAGNOSIS — E28.2 POLYCYSTIC DISEASE, OVARIES: ICD-10-CM

## 2024-11-12 NOTE — TELEPHONE ENCOUNTER
Medication Name: Metformin (Glucophage)   Dose: 500 mg tb  Frequency: 1 tb 2x a day with meals   Quantity left: 0    Medication Name: valsartan-hydrochlorothiazide (Diovan-HCT)  Dose: 320- 25 mg tb  Frequency:1 tb by mouth once daily   Quantity left: 0    Medication Name:am LODIPine (Norvasc)   Dose: 5 mg  Frequency: 1 tb by mouth once daily   Quantity left: 0      Pharmacy: [id number] [place], [place]   [address] [address]      Last appointment: 5/7/2024   Last CPE: 5/8/2023   Last MCW: N/A  Next appointment:  11/22/2024   Next CPE: N/A  Next MCW: N/A

## 2024-11-13 ENCOUNTER — TELEPHONE (OUTPATIENT)
Dept: PRIMARY CARE | Facility: CLINIC | Age: 44
End: 2024-11-13

## 2024-11-13 DIAGNOSIS — E28.2 POLYCYSTIC DISEASE, OVARIES: ICD-10-CM

## 2024-11-13 DIAGNOSIS — I10 BENIGN HYPERTENSION: ICD-10-CM

## 2024-11-13 DIAGNOSIS — E88.819 INSULIN RESISTANCE: ICD-10-CM

## 2024-11-13 RX ORDER — METFORMIN HYDROCHLORIDE 500 MG/1
500 TABLET ORAL
Qty: 60 TABLET | Refills: 0 | Status: SHIPPED | OUTPATIENT
Start: 2024-11-13 | End: 2024-11-14 | Stop reason: SDUPTHER

## 2024-11-13 RX ORDER — AMLODIPINE BESYLATE 5 MG/1
5 TABLET ORAL DAILY
Qty: 30 TABLET | Refills: 0 | Status: SHIPPED | OUTPATIENT
Start: 2024-11-13 | End: 2024-11-14 | Stop reason: SDUPTHER

## 2024-11-13 RX ORDER — VALSARTAN AND HYDROCHLOROTHIAZIDE 320; 25 MG/1; MG/1
1 TABLET, FILM COATED ORAL DAILY
Qty: 30 TABLET | Refills: 0 | Status: SHIPPED | OUTPATIENT
Start: 2024-11-13 | End: 2024-11-14 | Stop reason: SDUPTHER

## 2024-11-13 NOTE — TELEPHONE ENCOUNTER
SPOKE TO PATIENT ON 11.13.2024 AND LET HER KNOW THE AMOUNT IT WOULD BE WITH THE 20 PERCENT .40 THE DAY SHE COMES IN ALSO MADE A NOTE ON STICKY NOTE ON HER CHART.

## 2024-11-13 NOTE — TELEPHONE ENCOUNTER
I called patient today to give her pricing for per upcoming appointment per MA request and she states she is out of meds is there anyway you can send short supply in till she sees PCP on 11.22.24    Medication Name: Metformin (Glucophage)   Dose: 500 mg tb  Frequency: 1 tb 2x a day with meals   Quantity left: 0     Medication Name: valsartan-hydrochlorothiazide (Diovan-HCT)  Dose: 320- 25 mg tb  Frequency:1 tb by mouth once daily   Quantity left: 0     Medication Name:am LODIPine (Norvasc)   Dose: 5 mg  Frequency: 1 tb by mouth once daily   Quantity left: 0        Pharmacy: [id number] [place], [place]   [address] [phone]        Last appointment: 5/7/2024   Last CPE: 5/8/2023   Last MCW: N/A  Next appointment:  11/22/2024   Next CPE: N/A  Next MCW: N/A

## 2024-11-14 RX ORDER — METFORMIN HYDROCHLORIDE 500 MG/1
500 TABLET ORAL
Qty: 180 TABLET | Refills: 0 | Status: SHIPPED | OUTPATIENT
Start: 2024-11-14

## 2024-11-14 RX ORDER — VALSARTAN AND HYDROCHLOROTHIAZIDE 320; 25 MG/1; MG/1
1 TABLET, FILM COATED ORAL DAILY
Qty: 90 TABLET | Refills: 0 | Status: SHIPPED | OUTPATIENT
Start: 2024-11-14

## 2024-11-14 RX ORDER — AMLODIPINE BESYLATE 5 MG/1
5 TABLET ORAL DAILY
Qty: 90 TABLET | Refills: 0 | Status: SHIPPED | OUTPATIENT
Start: 2024-11-14

## 2024-11-22 ENCOUNTER — APPOINTMENT (OUTPATIENT)
Dept: PRIMARY CARE | Facility: CLINIC | Age: 44
End: 2024-11-22

## 2024-11-22 VITALS
WEIGHT: 270 LBS | HEART RATE: 79 BPM | DIASTOLIC BLOOD PRESSURE: 84 MMHG | SYSTOLIC BLOOD PRESSURE: 118 MMHG | BODY MASS INDEX: 48.21 KG/M2 | OXYGEN SATURATION: 97 %

## 2024-11-22 DIAGNOSIS — I10 BENIGN HYPERTENSION: ICD-10-CM

## 2024-11-22 DIAGNOSIS — E88.819 INSULIN RESISTANCE: ICD-10-CM

## 2024-11-22 DIAGNOSIS — E28.2 POLYCYSTIC DISEASE, OVARIES: ICD-10-CM

## 2024-11-22 DIAGNOSIS — N93.9 ABNORMAL UTERINE BLEEDING: ICD-10-CM

## 2024-11-22 PROCEDURE — 3079F DIAST BP 80-89 MM HG: CPT | Performed by: FAMILY MEDICINE

## 2024-11-22 PROCEDURE — 3074F SYST BP LT 130 MM HG: CPT | Performed by: FAMILY MEDICINE

## 2024-11-22 PROCEDURE — 99214 OFFICE O/P EST MOD 30 MIN: CPT | Performed by: FAMILY MEDICINE

## 2024-11-22 RX ORDER — VALSARTAN AND HYDROCHLOROTHIAZIDE 320; 25 MG/1; MG/1
1 TABLET, FILM COATED ORAL DAILY
Qty: 90 TABLET | Refills: 0 | Status: SHIPPED | OUTPATIENT
Start: 2024-11-22 | End: 2024-11-22 | Stop reason: SDUPTHER

## 2024-11-22 RX ORDER — MEGESTROL ACETATE 20 MG/1
20 TABLET ORAL 2 TIMES DAILY
Start: 2024-11-22 | End: 2025-01-17

## 2024-11-22 RX ORDER — VALSARTAN AND HYDROCHLOROTHIAZIDE 320; 25 MG/1; MG/1
1 TABLET, FILM COATED ORAL DAILY
Qty: 90 TABLET | Refills: 1 | Status: SHIPPED | OUTPATIENT
Start: 2024-11-22

## 2024-11-22 RX ORDER — METFORMIN HYDROCHLORIDE 500 MG/1
500 TABLET ORAL
Qty: 180 TABLET | Refills: 1 | Status: SHIPPED | OUTPATIENT
Start: 2024-11-22

## 2024-11-22 RX ORDER — AMLODIPINE BESYLATE 5 MG/1
5 TABLET ORAL DAILY
Qty: 90 TABLET | Refills: 0 | Status: SHIPPED | OUTPATIENT
Start: 2024-11-22 | End: 2024-11-22 | Stop reason: SDUPTHER

## 2024-11-22 RX ORDER — AMLODIPINE BESYLATE 5 MG/1
5 TABLET ORAL DAILY
Qty: 90 TABLET | Refills: 1 | Status: SHIPPED | OUTPATIENT
Start: 2024-11-22

## 2024-11-22 RX ORDER — METFORMIN HYDROCHLORIDE 500 MG/1
500 TABLET ORAL
Qty: 180 TABLET | Refills: 0 | Status: SHIPPED | OUTPATIENT
Start: 2024-11-22 | End: 2024-11-22 | Stop reason: SDUPTHER

## 2024-11-22 ASSESSMENT — PATIENT HEALTH QUESTIONNAIRE - PHQ9
5. POOR APPETITE OR OVEREATING: NOT AT ALL
6. FEELING BAD ABOUT YOURSELF - OR THAT YOU ARE A FAILURE OR HAVE LET YOURSELF OR YOUR FAMILY DOWN: NOT AT ALL
8. MOVING OR SPEAKING SO SLOWLY THAT OTHER PEOPLE COULD HAVE NOTICED. OR THE OPPOSITE, BEING SO FIGETY OR RESTLESS THAT YOU HAVE BEEN MOVING AROUND A LOT MORE THAN USUAL: NOT AT ALL
9. THOUGHTS THAT YOU WOULD BE BETTER OFF DEAD, OR OF HURTING YOURSELF: NOT AT ALL
10. IF YOU CHECKED OFF ANY PROBLEMS, HOW DIFFICULT HAVE THESE PROBLEMS MADE IT FOR YOU TO DO YOUR WORK, TAKE CARE OF THINGS AT HOME, OR GET ALONG WITH OTHER PEOPLE: NOT DIFFICULT AT ALL
2. FEELING DOWN, DEPRESSED OR HOPELESS: NOT AT ALL
3. TROUBLE FALLING OR STAYING ASLEEP OR SLEEPING TOO MUCH: MORE THAN HALF THE DAYS
1. LITTLE INTEREST OR PLEASURE IN DOING THINGS: NOT AT ALL
4. FEELING TIRED OR HAVING LITTLE ENERGY: SEVERAL DAYS
7. TROUBLE CONCENTRATING ON THINGS, SUCH AS READING THE NEWSPAPER OR WATCHING TELEVISION: NOT AT ALL
SUM OF ALL RESPONSES TO PHQ QUESTIONS 1-9: 3
SUM OF ALL RESPONSES TO PHQ9 QUESTIONS 1 AND 2: 0

## 2024-11-22 ASSESSMENT — ANXIETY QUESTIONNAIRES
6. BECOMING EASILY ANNOYED OR IRRITABLE: NOT AT ALL
IF YOU CHECKED OFF ANY PROBLEMS ON THIS QUESTIONNAIRE, HOW DIFFICULT HAVE THESE PROBLEMS MADE IT FOR YOU TO DO YOUR WORK, TAKE CARE OF THINGS AT HOME, OR GET ALONG WITH OTHER PEOPLE: NOT DIFFICULT AT ALL
2. NOT BEING ABLE TO STOP OR CONTROL WORRYING: NOT AT ALL
5. BEING SO RESTLESS THAT IT IS HARD TO SIT STILL: NOT AT ALL
GAD7 TOTAL SCORE: 0
1. FEELING NERVOUS, ANXIOUS, OR ON EDGE: NOT AT ALL
7. FEELING AFRAID AS IF SOMETHING AWFUL MIGHT HAPPEN: NOT AT ALL
3. WORRYING TOO MUCH ABOUT DIFFERENT THINGS: NOT AT ALL
4. TROUBLE RELAXING: NOT AT ALL

## 2024-11-22 NOTE — PROGRESS NOTES
Subjective   Patient ID: Brittanie Maxwell is a 44 y.o. female who presents for Follow-up.    HPI    HTN:  BP controlled today  Taking medications as directed - no side effects noted.  Denies CP, SOB, Edema, palpitations or headache.     INSULIN RESISTANCE:  followed by [facility] weight loss clinic  Followed by a nutritionist and sees them monthly  Currently on Semaglutide started in 9/2024  [place] and sees them every 8 weeks  No constipation currently    She is down 20 pounds    Currently between insurance.    Review of Systems    Review of Systems negative except as noted in HPI and Chief complaint.     Objective   Patient Health Questionnaire-9 Score: 3     JOSE-7 Total Score: 0     VITALS:  /84 (BP Location: Left arm, Patient Position: Sitting, BP Cuff Size: Adult)   Pulse 79   Wt 122 kg (270 lb)   SpO2 97%   BMI 48.21 kg/m²      Physical Exam  Constitutional:       General: She is not in acute distress.     Appearance: Normal appearance. She is not ill-appearing.   HENT:      Head: Normocephalic and atraumatic.   Neck:      Vascular: No carotid bruit.   Cardiovascular:      Rate and Rhythm: Normal rate and regular rhythm.      Pulses: Normal pulses.      Heart sounds: Normal heart sounds. No murmur heard.     No gallop.   Pulmonary:      Effort: Pulmonary effort is normal.      Breath sounds: Normal breath sounds. No wheezing, rhonchi or rales.   Musculoskeletal:      Cervical back: Normal range of motion and neck supple. No rigidity or tenderness.   Lymphadenopathy:      Cervical: No cervical adenopathy.   Skin:     General: Skin is warm and dry.   Neurological:      Mental Status: She is alert.   Psychiatric:         Mood and Affect: Mood normal.         Behavior: Behavior normal.         Assessment/Plan   Problem List Items Addressed This Visit       Abnormal uterine bleeding    Relevant Medications    megestrol (Megace) 20 mg tablet    Benign hypertension    Relevant Medications    amLODIPine  (Norvasc) 5 mg tablet    valsartan-hydrochlorothiazide (Diovan-HCT) 320-25 mg tablet    Insulin resistance    Relevant Medications    metFORMIN (Glucophage) 500 mg tablet    Polycystic disease, ovaries    Relevant Medications    metFORMIN (Glucophage) 500 mg tablet       FOLLOW UP 6 MONTHS, SOONER WITH ANY PROBLEMS OR CONCERNS.

## 2025-05-29 ENCOUNTER — APPOINTMENT (OUTPATIENT)
Dept: PRIMARY CARE | Facility: CLINIC | Age: 45
End: 2025-05-29

## 2025-05-29 VITALS
HEIGHT: 63 IN | BODY MASS INDEX: 49.08 KG/M2 | SYSTOLIC BLOOD PRESSURE: 113 MMHG | DIASTOLIC BLOOD PRESSURE: 72 MMHG | OXYGEN SATURATION: 96 % | WEIGHT: 277 LBS | HEART RATE: 96 BPM

## 2025-05-29 DIAGNOSIS — Z13.6 SCREENING FOR CARDIOVASCULAR CONDITION: ICD-10-CM

## 2025-05-29 DIAGNOSIS — Z00.00 ANNUAL PHYSICAL EXAM: Primary | ICD-10-CM

## 2025-05-29 DIAGNOSIS — Z12.31 SCREENING MAMMOGRAM, ENCOUNTER FOR: ICD-10-CM

## 2025-05-29 DIAGNOSIS — E55.9 VITAMIN D DEFICIENCY: ICD-10-CM

## 2025-05-29 DIAGNOSIS — F32.A ANXIETY AND DEPRESSION: ICD-10-CM

## 2025-05-29 DIAGNOSIS — Z13.0 SCREENING FOR DISORDER OF BLOOD AND BLOOD-FORMING ORGANS: ICD-10-CM

## 2025-05-29 DIAGNOSIS — E28.2 POLYCYSTIC DISEASE, OVARIES: ICD-10-CM

## 2025-05-29 DIAGNOSIS — Z13.29 THYROID DISORDER SCREENING: ICD-10-CM

## 2025-05-29 DIAGNOSIS — E66.813 CLASS 3 SEVERE OBESITY DUE TO EXCESS CALORIES WITH SERIOUS COMORBIDITY AND BODY MASS INDEX (BMI) OF 40.0 TO 44.9 IN ADULT: ICD-10-CM

## 2025-05-29 DIAGNOSIS — G47.33 OSA (OBSTRUCTIVE SLEEP APNEA): ICD-10-CM

## 2025-05-29 DIAGNOSIS — I10 BENIGN HYPERTENSION: ICD-10-CM

## 2025-05-29 DIAGNOSIS — E88.819 INSULIN RESISTANCE: ICD-10-CM

## 2025-05-29 DIAGNOSIS — F41.9 ANXIETY AND DEPRESSION: ICD-10-CM

## 2025-05-29 PROCEDURE — 3008F BODY MASS INDEX DOCD: CPT | Performed by: FAMILY MEDICINE

## 2025-05-29 PROCEDURE — 99213 OFFICE O/P EST LOW 20 MIN: CPT | Performed by: FAMILY MEDICINE

## 2025-05-29 PROCEDURE — 4004F PT TOBACCO SCREEN RCVD TLK: CPT | Performed by: FAMILY MEDICINE

## 2025-05-29 PROCEDURE — 99396 PREV VISIT EST AGE 40-64: CPT | Performed by: FAMILY MEDICINE

## 2025-05-29 PROCEDURE — 3074F SYST BP LT 130 MM HG: CPT | Performed by: FAMILY MEDICINE

## 2025-05-29 PROCEDURE — 3078F DIAST BP <80 MM HG: CPT | Performed by: FAMILY MEDICINE

## 2025-05-29 RX ORDER — VALSARTAN AND HYDROCHLOROTHIAZIDE 320; 25 MG/1; MG/1
1 TABLET, FILM COATED ORAL DAILY
Qty: 90 TABLET | Refills: 1 | Status: SHIPPED | OUTPATIENT
Start: 2025-05-29

## 2025-05-29 RX ORDER — METFORMIN HYDROCHLORIDE 500 MG/1
500 TABLET ORAL
Qty: 180 TABLET | Refills: 1 | Status: SHIPPED | OUTPATIENT
Start: 2025-05-29

## 2025-05-29 RX ORDER — AMLODIPINE BESYLATE 5 MG/1
5 TABLET ORAL DAILY
Qty: 90 TABLET | Refills: 1 | Status: SHIPPED | OUTPATIENT
Start: 2025-05-29

## 2025-05-29 ASSESSMENT — PATIENT HEALTH QUESTIONNAIRE - PHQ9
8. MOVING OR SPEAKING SO SLOWLY THAT OTHER PEOPLE COULD HAVE NOTICED. OR THE OPPOSITE, BEING SO FIGETY OR RESTLESS THAT YOU HAVE BEEN MOVING AROUND A LOT MORE THAN USUAL: NOT AT ALL
7. TROUBLE CONCENTRATING ON THINGS, SUCH AS READING THE NEWSPAPER OR WATCHING TELEVISION: SEVERAL DAYS
SUM OF ALL RESPONSES TO PHQ9 QUESTIONS 1 AND 2: 1
9. THOUGHTS THAT YOU WOULD BE BETTER OFF DEAD, OR OF HURTING YOURSELF: NOT AT ALL
SUM OF ALL RESPONSES TO PHQ QUESTIONS 1-9: 8
6. FEELING BAD ABOUT YOURSELF - OR THAT YOU ARE A FAILURE OR HAVE LET YOURSELF OR YOUR FAMILY DOWN: NOT AT ALL
5. POOR APPETITE OR OVEREATING: SEVERAL DAYS
4. FEELING TIRED OR HAVING LITTLE ENERGY: MORE THAN HALF THE DAYS
1. LITTLE INTEREST OR PLEASURE IN DOING THINGS: SEVERAL DAYS
3. TROUBLE FALLING OR STAYING ASLEEP OR SLEEPING TOO MUCH: NEARLY EVERY DAY
2. FEELING DOWN, DEPRESSED OR HOPELESS: NOT AT ALL

## 2025-05-29 ASSESSMENT — ANXIETY QUESTIONNAIRES
7. FEELING AFRAID AS IF SOMETHING AWFUL MIGHT HAPPEN: NOT AT ALL
4. TROUBLE RELAXING: SEVERAL DAYS
6. BECOMING EASILY ANNOYED OR IRRITABLE: NOT AT ALL
5. BEING SO RESTLESS THAT IT IS HARD TO SIT STILL: NOT AT ALL
GAD7 TOTAL SCORE: 3
IF YOU CHECKED OFF ANY PROBLEMS ON THIS QUESTIONNAIRE, HOW DIFFICULT HAVE THESE PROBLEMS MADE IT FOR YOU TO DO YOUR WORK, TAKE CARE OF THINGS AT HOME, OR GET ALONG WITH OTHER PEOPLE: NOT DIFFICULT AT ALL
3. WORRYING TOO MUCH ABOUT DIFFERENT THINGS: SEVERAL DAYS
2. NOT BEING ABLE TO STOP OR CONTROL WORRYING: NOT AT ALL
1. FEELING NERVOUS, ANXIOUS, OR ON EDGE: SEVERAL DAYS

## 2025-05-29 NOTE — PROGRESS NOTES
Subjective   History of Present Illness  The patient is a 45-year-old female who presents for her annual physical and follow-up on sleep apnea, anxiety, weight management, hypertension, and diabetes.    Heartburn  She reports experiencing heartburn when consuming carbohydrates and fried foods, which she has been managing by reducing her gluten intake.  - Onset: When consuming carbohydrates and fried foods.  - Alleviating Factors: Reducing gluten intake.    Urinary Frequency  Her urinary frequency remains unchanged, and she does not experience nighttime urination.    Lifestyle Changes  She has discontinued alcohol consumption and is making efforts to quit smoking. She is currently taking multivitamins and vitamin D supplements. Her physical activity is limited to walking.    Diet and Weight Management  She has not consulted a dietitian but adheres to a Mediterranean diet, which she finds beneficial. Her diet is low in meat, with fish and chicken as her primary sources of protein. She continues to take metformin twice daily.  - Character: Mediterranean diet, low in meat, fish and chicken as primary sources of protein.  - Alleviating Factors: Finds the Mediterranean diet beneficial.    Hypertension and Diabetes  She is currently on valsartan and amlodipine. She continues to take metformin twice daily.  - Alleviating Factors: Valsartan, amlodipine, metformin.    Anxiety  She has been experiencing anxiety and has initiated therapy with Ivanna Kowalski at [facility], which she finds beneficial. She reports that her stress levels have been high but are now decreasing.  - Onset: High stress levels.  - Alleviating Factors: Therapy with Iavnna Kowalski at [facility].  - Severity: Stress levels are now decreasing.    SOCIAL HISTORY  - Has pretty much quit drinking  - Admits to smoking but is working on reducing it due to high stress levels      Do you take any herbs or supplements that were not prescribed by a doctor? Mvi + Vit D    SOC  Hx:   Tobacco Use: Low Risk  (6/17/2024)    Patient History     Smoking Tobacco Use: Never     Smokeless Tobacco Use: Never     Passive Exposure: Not on file     Alcohol Use: Not At Risk (5/7/2024)    AUDIT-C     Frequency of Alcohol Consumption: Monthly or less     Average Number of Drinks: Patient does not drink     Frequency of Binge Drinking: Never     DIET: Gluten free - otherwise well rounded  Mediterranei type diet    EXERCISE: irregularly    ELIMINATION: no constipation or diarrhea  GERD - worse with gluten    URINARY SYSTEM: none    SLEEP:  interrupted  H/O Sleep Apnea  She has previously undergone a sleep study, which yielded positive results, indicating severe sleep apnea. She was planning to have weight loss surgery but became pregnant. She also reports snoring.  - Onset: Diagnosed with severe sleep apnea from a sleep study.  - Character: Severe sleep apnea, snoring.    Poor Sleep Quality  She experiences poor sleep quality, characterized by frequent awakenings and restlessness, which she attributes to stress and caring for her 3-year-old child. She occasionally experiences hot flashes.  - Onset: Attributed to stress and caring for her 3-year-old child.  - Character: Frequent awakenings, restlessness, occasional hot flashes.    STOP-BANG Sleep Apnea Questionnaire    STOP  Do you SNORE loudly (louder than talking or loud enough to be heard through closed doors)? Yes    Do you often feel TIRED, fatigued, or sleep during daytime?  Yes    Has anyone OBSERVED you stop breathing during your sleep? No    Do you have or are you being treated for high blood PRESSURE? Yes    BANG  BMI more than 35 KG/M2? Yes    AGE over 50 years old? No    NECK circumference > 16 in (40 cm)? Yes    GENDER Male? No    TOTAL YES responses: 5    HIGH risk of HAMILTON: 5-8  INTERMEDIATE risk of HAMILTON: 3-4  LOW risk of HAMILTON: 0-2     EPWORTH SLEEPINESS SCALE (ESS) of sleep disorders    Situation = Chance of Dozing  0 -would never doze  1-  "slight chance of dozing  2 - moderate chance of dozing  3 - high chance of dozing    SITTING & READIN  WATCHING TELEVISION: 1  SITTING IN ACTIVE IN A PUBLIC PLACE (THEATER, MEETING): 0  PASSENGER IN A CAR FOR 60 + MINUTES: 0  LYING DOWN TO REST IN AFTERNOON: 1  SITTING & TALKING TO SOMEONE: 0  SITTING QUIETLY AFTER LUNCH (WHEN YOU HAVE HAD NO ALCOHOL): 0  IN A CAR WHILE STOPPED IN TRAFFIC: 0    MOODS: more anxious with recent stressors - situations much improved and she feels like she is doing much better  Patient Health Questionnaire-9 Score: 8  JOSE-7 Total Score: 3   Roya Kaur, therapist - [facility] seeing regularly     History:   Menstrual cycles - regular  LMP: No LMP recorded.  Last pap date: 24 HPV  Abnormal pap? no  MAMMO: 2023    Menstrual Cycle and Hysterectomy  She maintains a regular menstrual cycle. She had planned to undergo a hysterectomy but lost her job. She has since found new employment and is considering rescheduling the procedure.    Mammogram and Gynecologist  Her last mammogram was conducted prior to her pregnancy, and she has a follow-up appointment scheduled. She has recently switched to a new gynecologist at [place].      Review of Systems   Review of Systems negative except as noted in HPI and Chief complaint.     Objective     VITALS:  /72   Pulse 96   Ht 1.6 m (5' 3\")   Wt 126 kg (277 lb)   SpO2 96%   BMI 49.07 kg/m²      Physical Exam    Assessment/Plan     Healthy female exam.      1. Please have labs drawn at your earliest convenience.  You should fast 12 hours prior to arriving at the lab - during these 12 hours you may have water, black coffee, black tea - nothing with cream or sugar and no solid foods.    Our office will contact you with results after they have been reviewed.  Please allow 48 - 72 hours for most results, some may take longer.  Please keep in mind that results may post immediately to your online portal, even before we have a chance to " review them.  Once we have had the opportunity to review we will post comments and have our staff contact you with results and any instructions.  Please call our office if you do not hear from our office in 7 days.     2. Patient Counseling:  --Nutrition: Stressed importance of moderation in sodium/caffeine intake, saturated fat and cholesterol, caloric balance, sufficient intake of fresh fruits, vegetables, fiber, calcium, iron, and 1 mg of folate supplement per day (for females capable of pregnancy).  --Exercise: Stressed the importance of regular exercise.   --Immunizations reviewed.  --Discussed benefits of screening colonoscopy(patients > 45 years of age)    3. Discussed the patient's BMI with her.  The BMI is above average. The patient received Current weight: 126 kg (277 lb)  Weight change since last visit (-) denotes wt loss 7 lbs   Weight loss needed to achieve BMI 25: 136.2 Lbs  Weight loss needed to achieve BMI 30: 108 Lbs    Provided instructions on dietary changes  Provided instructions on exercise  Advised to Increase physical activity because they have an above normal BMI.    Assessment & Plan  Annual physical exam    Orders:    CBC; Future    Comprehensive Metabolic Panel; Future    Lipid Panel; Future    TSH with reflex to Free T4 if abnormal; Future    Screening mammogram, encounter for    Orders:    BI mammo bilateral screening tomosynthesis; Future    Benign hypertension  - Currently taking valsartan and amlodipine.  - Blood pressure readings are within the normal range.  - Medication regimen appears effective and will be continued.  Orders:    amLODIPine (Norvasc) 5 mg tablet; Take 1 tablet (5 mg) by mouth once daily.    valsartan-hydrochlorothiazide (Diovan-HCT) 320-25 mg tablet; Take 1 tablet by mouth once daily.    Screening for disorder of blood and blood-forming organs    Orders:    CBC; Future    Screening for cardiovascular condition    Orders:    Lipid Panel; Future    Thyroid disorder  screening    Orders:    TSH with reflex to Free T4 if abnormal; Future    Vitamin D deficiency    Orders:    Vitamin D 25-Hydroxy,Total (for eval of Vitamin D levels); Future    Class 3 severe obesity due to excess calories with serious comorbidity and body mass index (BMI) of 40.0 to 44.9 in adult  Weight management.  - Advised to continue with the Mediterranean diet and to consider working with a dietitian.  - If the sleep study is positive, medications like Mounjaro or Wegovy may be considered to help with weight loss.  - Previously experienced leg cramps while on semaglutide (Wegovy), which was discontinued due to cost.  - New insurance may provide coverage for weight management medications.       HAMILTON (obstructive sleep apnea)  Sleep apnea: Severe per patient  - Home sleep study will be conducted to reassess the severity of sleep apnea.  - If the study is positive, treatment options such as Mounjaro or Wegovy will be considered.  - Previous sleep study indicated severe sleep apnea, and a new study is warranted due to the time elapsed since the last assessment.  - Insurance coverage for the sleep study will be pursued.  Orders:    Home sleep apnea test (HSAT); Future    Insulin resistance    Orders:    metFORMIN (Glucophage) 500 mg tablet; Take 1 tablet (500 mg) by mouth 2 times daily (morning and late afternoon).    Polycystic disease, ovaries  - Currently taking metformin twice a day.  - Blood glucose levels and overall diabetes management will be reassessed with upcoming blood work.  - No changes to the current medication regimen are necessary at this time.  Orders:    metFORMIN (Glucophage) 500 mg tablet; Take 1 tablet (500 mg) by mouth 2 times daily (morning and late afternoon).    Anxiety and depression  Anxiety.  - Currently seeing a therapist, Ivanna Kowalski, and reports that therapy is helping.  - Advised to continue therapy and to inform if additional help is needed.  - Stress levels have been high due to  recent job changes, but are now improving.  - No additional medication for anxiety is deemed necessary at this time.         Assessment & Plan  Health maintenance.  - Blood pressure readings are within the normal range.  - Advised to undergo a home sleep study to assess the severity of sleep apnea.  - Comprehensive blood work panel will be ordered, requiring a 12-hour fast prior to the test.  - Scheduled for a mammogram. If the sleep study yields positive results, consideration will be given to initiating treatment with Mounjaro or Wegovy, both of which have shown efficacy in managing sleep apnea.    Follow-up  - Follow up in 6 months.       Follow up in 6 months pending review of above.     Alka Gregorio,   This medical note was created with the assistance of artificial intelligence (AI) for documentation purposes. The content has been reviewed and confirmed by the healthcare provider for accuracy and completeness. Patient consented to the use of audio recording and use of AI during their visit.

## 2025-05-29 NOTE — ASSESSMENT & PLAN NOTE
Anxiety.  - Currently seeing a therapist, Ivanna Kowalski, and reports that therapy is helping.  - Advised to continue therapy and to inform if additional help is needed.  - Stress levels have been high due to recent job changes, but are now improving.  - No additional medication for anxiety is deemed necessary at this time.

## 2025-05-29 NOTE — ASSESSMENT & PLAN NOTE
Weight management.  - Advised to continue with the Mediterranean diet and to consider working with a dietitian.  - If the sleep study is positive, medications like Mounjaro or Wegovy may be considered to help with weight loss.  - Previously experienced leg cramps while on semaglutide (Wegovy), which was discontinued due to cost.  - New insurance may provide coverage for weight management medications.

## 2025-05-29 NOTE — ASSESSMENT & PLAN NOTE
- Currently taking metformin twice a day.  - Blood glucose levels and overall diabetes management will be reassessed with upcoming blood work.  - No changes to the current medication regimen are necessary at this time.  Orders:    metFORMIN (Glucophage) 500 mg tablet; Take 1 tablet (500 mg) by mouth 2 times daily (morning and late afternoon).

## 2025-05-29 NOTE — ASSESSMENT & PLAN NOTE
- Currently taking valsartan and amlodipine.  - Blood pressure readings are within the normal range.  - Medication regimen appears effective and will be continued.  Orders:    amLODIPine (Norvasc) 5 mg tablet; Take 1 tablet (5 mg) by mouth once daily.    valsartan-hydrochlorothiazide (Diovan-HCT) 320-25 mg tablet; Take 1 tablet by mouth once daily.

## 2025-06-03 ENCOUNTER — APPOINTMENT (OUTPATIENT)
Dept: RADIOLOGY | Facility: CLINIC | Age: 45
End: 2025-06-03
Payer: COMMERCIAL

## 2025-06-08 ENCOUNTER — TELEMEDICINE (OUTPATIENT)
Dept: PRIMARY CARE | Facility: CLINIC | Age: 45
End: 2025-06-08
Payer: COMMERCIAL

## 2025-06-08 DIAGNOSIS — J06.9 ACUTE UPPER RESPIRATORY INFECTION: Primary | ICD-10-CM

## 2025-06-08 PROCEDURE — 99213 OFFICE O/P EST LOW 20 MIN: CPT | Performed by: NURSE PRACTITIONER

## 2025-06-08 PROCEDURE — 4004F PT TOBACCO SCREEN RCVD TLK: CPT | Performed by: NURSE PRACTITIONER

## 2025-06-08 RX ORDER — FLUTICASONE PROPIONATE 50 MCG
2 SPRAY, SUSPENSION (ML) NASAL DAILY
Qty: 16 G | Refills: 0 | Status: SHIPPED | OUTPATIENT
Start: 2025-06-08 | End: 2026-06-08

## 2025-06-08 ASSESSMENT — ENCOUNTER SYMPTOMS
CHEST TIGHTNESS: 0
ABDOMINAL PAIN: 0
SHORTNESS OF BREATH: 0
CHILLS: 1
DIARRHEA: 0
APPETITE CHANGE: 0
VOMITING: 0
WHEEZING: 0
MYALGIAS: 0
NAUSEA: 0
COUGH: 1
HEADACHES: 1
FEVER: 1
FATIGUE: 1
SORE THROAT: 1
RHINORRHEA: 1

## 2025-06-08 ASSESSMENT — LIFESTYLE VARIABLES: HISTORY_OF_SMOKING: I SMOKED IN THE PAST, BUT NOT REGULARLY

## 2025-06-08 NOTE — PROGRESS NOTES
Symptoms started yesterday morning with sinus pressure, post nasal drainage, sore throat, headache. Pt says that she has pressure in her right cheek. Patient has tried robitussin and ibuprofen. Medication is slightly helping. Pt did not test for COVID.          Review of Systems   Constitutional:  Positive for chills, fatigue and fever (pt with chills and sweats). Negative for appetite change.   HENT:  Positive for congestion, postnasal drip, rhinorrhea and sore throat.    Respiratory:  Positive for cough. Negative for chest tightness, shortness of breath and wheezing.    Cardiovascular:  Negative for chest pain.   Gastrointestinal:  Negative for abdominal pain, diarrhea, nausea and vomiting.   Musculoskeletal:  Negative for myalgias.   Neurological:  Positive for headaches.       Objective   There were no vitals taken for this visit.    Physical Exam  Constitutional:       Appearance: Normal appearance.   Pulmonary:      Effort: Pulmonary effort is normal.   Neurological:      Mental Status: She is alert.     Physical exam limited due to the nature of the visit    Assessment/Plan   Problem List Items Addressed This Visit    None  Visit Diagnoses         Codes      Acute upper respiratory infection    -  Primary J06.9    Relevant Medications    fluticasone (Flonase) 50 mcg/actuation nasal spray        Virtual or Telephone Consent    An interactive audio and video telecommunication system which permits real time communications between the patient (at the originating site) and provider (at the distant site) was utilized to provide this telehealth service.   Verbal consent was requested and obtained from Brittanie Maxwell on this date, 06/08/25 for a telehealth visit and the patient's location was confirmed at the time of the visit. Pt stated that she is in [place] for the visit    Discussed that pt's symptoms are consistent with a viral upper respiratory infection. Advised that patient is to test for COVID at home  and can follow up for a positive result. Advised that if patient is concerned for strep, she is to visit the urgent care for strep testing. Advised patient on use of humidifier and hot steam treatments. Discussed that patient is to drink plenty of fluids and stay well hydrated. Can take ibuprofen as needed for any fevers or discomfort. Discussed that patient is to go to the ER for any chest pain, difficulty breathing, shortness of breath or new/concerning symptoms; she agreed. Pt to follow up in 2-3 days if no better despite the medication.

## 2025-06-14 ENCOUNTER — CLINICAL SUPPORT (OUTPATIENT)
Dept: SLEEP MEDICINE | Facility: HOSPITAL | Age: 45
End: 2025-06-14
Payer: COMMERCIAL

## 2025-06-14 DIAGNOSIS — G47.33 OSA (OBSTRUCTIVE SLEEP APNEA): ICD-10-CM

## 2025-06-14 PROCEDURE — 95806 SLEEP STUDY UNATT&RESP EFFT: CPT | Performed by: PSYCHIATRY & NEUROLOGY

## 2025-06-18 ENCOUNTER — APPOINTMENT (OUTPATIENT)
Dept: RADIOLOGY | Facility: CLINIC | Age: 45
End: 2025-06-18
Payer: COMMERCIAL

## 2025-06-18 DIAGNOSIS — Z12.31 SCREENING MAMMOGRAM, ENCOUNTER FOR: ICD-10-CM

## 2025-06-18 PROCEDURE — 77063 BREAST TOMOSYNTHESIS BI: CPT | Performed by: STUDENT IN AN ORGANIZED HEALTH CARE EDUCATION/TRAINING PROGRAM

## 2025-06-18 PROCEDURE — 77067 SCR MAMMO BI INCL CAD: CPT | Performed by: STUDENT IN AN ORGANIZED HEALTH CARE EDUCATION/TRAINING PROGRAM

## 2025-06-18 PROCEDURE — 77067 SCR MAMMO BI INCL CAD: CPT

## 2025-12-01 ENCOUNTER — APPOINTMENT (OUTPATIENT)
Dept: PRIMARY CARE | Facility: CLINIC | Age: 45
End: 2025-12-01
Payer: COMMERCIAL